# Patient Record
Sex: FEMALE | Race: WHITE | NOT HISPANIC OR LATINO | URBAN - METROPOLITAN AREA
[De-identification: names, ages, dates, MRNs, and addresses within clinical notes are randomized per-mention and may not be internally consistent; named-entity substitution may affect disease eponyms.]

---

## 2017-12-07 ENCOUNTER — INPATIENT (INPATIENT)
Facility: HOSPITAL | Age: 82
LOS: 5 days | Discharge: NURSING, REHAB OR LONG-TERM CARE | DRG: 467 | End: 2017-12-13
Attending: ORTHOPAEDIC SURGERY | Admitting: ORTHOPAEDIC SURGERY
Payer: MEDICARE

## 2017-12-07 VITALS
RESPIRATION RATE: 20 BRPM | OXYGEN SATURATION: 95 % | DIASTOLIC BLOOD PRESSURE: 77 MMHG | SYSTOLIC BLOOD PRESSURE: 168 MMHG | TEMPERATURE: 100 F | HEART RATE: 85 BPM

## 2017-12-07 LAB
ALBUMIN SERPL ELPH-MCNC: 3.4 G/DL — SIGNIFICANT CHANGE UP (ref 3.3–5)
ALP SERPL-CCNC: 104 U/L — SIGNIFICANT CHANGE UP (ref 40–120)
ALT FLD-CCNC: 14 U/L RC — SIGNIFICANT CHANGE UP (ref 10–45)
ANION GAP SERPL CALC-SCNC: 14 MMOL/L — SIGNIFICANT CHANGE UP (ref 5–17)
APPEARANCE UR: CLEAR — SIGNIFICANT CHANGE UP
APTT BLD: 37.6 SEC — HIGH (ref 27.5–37.4)
AST SERPL-CCNC: 25 U/L — SIGNIFICANT CHANGE UP (ref 10–40)
BASOPHILS # BLD AUTO: 0 K/UL — SIGNIFICANT CHANGE UP (ref 0–0.2)
BASOPHILS NFR BLD AUTO: 0.2 % — SIGNIFICANT CHANGE UP (ref 0–2)
BILIRUB SERPL-MCNC: 0.4 MG/DL — SIGNIFICANT CHANGE UP (ref 0.2–1.2)
BILIRUB UR-MCNC: NEGATIVE — SIGNIFICANT CHANGE UP
BLD GP AB SCN SERPL QL: NEGATIVE — SIGNIFICANT CHANGE UP
BUN SERPL-MCNC: 28 MG/DL — HIGH (ref 7–23)
CALCIUM SERPL-MCNC: 9 MG/DL — SIGNIFICANT CHANGE UP (ref 8.4–10.5)
CHLORIDE SERPL-SCNC: 103 MMOL/L — SIGNIFICANT CHANGE UP (ref 96–108)
CO2 SERPL-SCNC: 23 MMOL/L — SIGNIFICANT CHANGE UP (ref 22–31)
COLOR SPEC: SIGNIFICANT CHANGE UP
CREAT SERPL-MCNC: 1.1 MG/DL — SIGNIFICANT CHANGE UP (ref 0.5–1.3)
DIFF PNL FLD: NEGATIVE — SIGNIFICANT CHANGE UP
EOSINOPHIL # BLD AUTO: 0.1 K/UL — SIGNIFICANT CHANGE UP (ref 0–0.5)
EOSINOPHIL NFR BLD AUTO: 1.6 % — SIGNIFICANT CHANGE UP (ref 0–6)
GLUCOSE SERPL-MCNC: 124 MG/DL — HIGH (ref 70–99)
GLUCOSE UR QL: NEGATIVE — SIGNIFICANT CHANGE UP
HCT VFR BLD CALC: 33.8 % — LOW (ref 34.5–45)
HGB BLD-MCNC: 11.6 G/DL — SIGNIFICANT CHANGE UP (ref 11.5–15.5)
INR BLD: 2.21 RATIO — HIGH (ref 0.88–1.16)
KETONES UR-MCNC: ABNORMAL
LEUKOCYTE ESTERASE UR-ACNC: NEGATIVE — SIGNIFICANT CHANGE UP
LYMPHOCYTES # BLD AUTO: 0.9 K/UL — LOW (ref 1–3.3)
LYMPHOCYTES # BLD AUTO: 9.7 % — LOW (ref 13–44)
MCHC RBC-ENTMCNC: 31.8 PG — SIGNIFICANT CHANGE UP (ref 27–34)
MCHC RBC-ENTMCNC: 34.3 GM/DL — SIGNIFICANT CHANGE UP (ref 32–36)
MCV RBC AUTO: 92.6 FL — SIGNIFICANT CHANGE UP (ref 80–100)
MONOCYTES # BLD AUTO: 1.2 K/UL — HIGH (ref 0–0.9)
MONOCYTES NFR BLD AUTO: 13.7 % — SIGNIFICANT CHANGE UP (ref 2–14)
NEUTROPHILS # BLD AUTO: 6.8 K/UL — SIGNIFICANT CHANGE UP (ref 1.8–7.4)
NEUTROPHILS NFR BLD AUTO: 74.8 % — SIGNIFICANT CHANGE UP (ref 43–77)
NITRITE UR-MCNC: NEGATIVE — SIGNIFICANT CHANGE UP
PH UR: 6 — SIGNIFICANT CHANGE UP (ref 5–8)
PLATELET # BLD AUTO: 387 K/UL — SIGNIFICANT CHANGE UP (ref 150–400)
POTASSIUM SERPL-MCNC: 4.1 MMOL/L — SIGNIFICANT CHANGE UP (ref 3.5–5.3)
POTASSIUM SERPL-SCNC: 4.1 MMOL/L — SIGNIFICANT CHANGE UP (ref 3.5–5.3)
PROT SERPL-MCNC: 6.3 G/DL — SIGNIFICANT CHANGE UP (ref 6–8.3)
PROT UR-MCNC: SIGNIFICANT CHANGE UP
PROTHROM AB SERPL-ACNC: 24.3 SEC — HIGH (ref 9.8–12.7)
RBC # BLD: 3.65 M/UL — LOW (ref 3.8–5.2)
RBC # FLD: 13.4 % — SIGNIFICANT CHANGE UP (ref 10.3–14.5)
RH IG SCN BLD-IMP: POSITIVE — SIGNIFICANT CHANGE UP
SODIUM SERPL-SCNC: 140 MMOL/L — SIGNIFICANT CHANGE UP (ref 135–145)
SP GR SPEC: 1.02 — SIGNIFICANT CHANGE UP (ref 1.01–1.02)
UROBILINOGEN FLD QL: NEGATIVE — SIGNIFICANT CHANGE UP
WBC # BLD: 9 K/UL — SIGNIFICANT CHANGE UP (ref 3.8–10.5)
WBC # FLD AUTO: 9 K/UL — SIGNIFICANT CHANGE UP (ref 3.8–10.5)

## 2017-12-07 PROCEDURE — 71010: CPT | Mod: 26

## 2017-12-07 PROCEDURE — 99152 MOD SED SAME PHYS/QHP 5/>YRS: CPT

## 2017-12-07 PROCEDURE — 99285 EMERGENCY DEPT VISIT HI MDM: CPT | Mod: 25

## 2017-12-07 RX ORDER — FUROSEMIDE 40 MG
20 TABLET ORAL EVERY OTHER DAY
Qty: 0 | Refills: 0 | Status: DISCONTINUED | OUTPATIENT
Start: 2017-12-07 | End: 2017-12-10

## 2017-12-07 RX ORDER — LEVOTHYROXINE SODIUM 125 MCG
25 TABLET ORAL DAILY
Qty: 0 | Refills: 0 | Status: DISCONTINUED | OUTPATIENT
Start: 2017-12-07 | End: 2017-12-11

## 2017-12-07 RX ORDER — TIOTROPIUM BROMIDE 18 UG/1
1 CAPSULE ORAL; RESPIRATORY (INHALATION) DAILY
Qty: 0 | Refills: 0 | Status: DISCONTINUED | OUTPATIENT
Start: 2017-12-07 | End: 2017-12-11

## 2017-12-07 RX ORDER — AMLODIPINE BESYLATE 2.5 MG/1
5 TABLET ORAL DAILY
Qty: 0 | Refills: 0 | Status: DISCONTINUED | OUTPATIENT
Start: 2017-12-07 | End: 2017-12-11

## 2017-12-07 RX ORDER — BUDESONIDE, MICRONIZED 100 %
0.5 POWDER (GRAM) MISCELLANEOUS
Qty: 0 | Refills: 0 | Status: DISCONTINUED | OUTPATIENT
Start: 2017-12-07 | End: 2017-12-11

## 2017-12-07 RX ORDER — ALBUTEROL 90 UG/1
2 AEROSOL, METERED ORAL EVERY 6 HOURS
Qty: 0 | Refills: 0 | Status: DISCONTINUED | OUTPATIENT
Start: 2017-12-07 | End: 2017-12-11

## 2017-12-07 RX ORDER — SODIUM CHLORIDE 0.65 %
1 AEROSOL, SPRAY (ML) NASAL DAILY
Qty: 0 | Refills: 0 | Status: DISCONTINUED | OUTPATIENT
Start: 2017-12-07 | End: 2017-12-11

## 2017-12-07 RX ORDER — FAMOTIDINE 10 MG/ML
20 INJECTION INTRAVENOUS EVERY OTHER DAY
Qty: 0 | Refills: 0 | Status: DISCONTINUED | OUTPATIENT
Start: 2017-12-07 | End: 2017-12-10

## 2017-12-07 RX ORDER — DRONEDARONE 400 MG/1
400 TABLET, FILM COATED ORAL
Qty: 0 | Refills: 0 | Status: DISCONTINUED | OUTPATIENT
Start: 2017-12-07 | End: 2017-12-11

## 2017-12-07 RX ORDER — VENLAFAXINE HCL 75 MG
75 CAPSULE, EXT RELEASE 24 HR ORAL
Qty: 0 | Refills: 0 | Status: DISCONTINUED | OUTPATIENT
Start: 2017-12-07 | End: 2017-12-11

## 2017-12-07 RX ORDER — ALPRAZOLAM 0.25 MG
0.25 TABLET ORAL AT BEDTIME
Qty: 0 | Refills: 0 | Status: DISCONTINUED | OUTPATIENT
Start: 2017-12-07 | End: 2017-12-11

## 2017-12-07 RX ADMIN — PROPOFOL 40 MILLIGRAM(S): 10 INJECTION, EMULSION INTRAVENOUS at 23:52

## 2017-12-07 NOTE — H&P ADULT - NSHPLABSRESULTS_GEN_ALL_CORE
11.6   9.0   )-----------( 387      ( 07 Dec 2017 22:09 )             33.8             Vital Signs Last 24 Hrs  T(C): 37.2 (12-07-17 @ 20:45), Max: 37.6 (12-07-17 @ 20:35)  T(F): 99 (12-07-17 @ 20:45), Max: 99.6 (12-07-17 @ 20:35)  HR: 85 (12-07-17 @ 20:45) (85 - 85)  BP: 173/81 (12-07-17 @ 20:45) (168/77 - 173/81)  BP(mean): --  RR: 18 (12-07-17 @ 20:45) (18 - 20)  SpO2: 96% (12-07-17 @ 20:45) (95% - 96%)    Imaging: XR were personally reviewed and dislocated L ROB constrained liner

## 2017-12-07 NOTE — H&P ADULT - ASSESSMENT
A/P: 87y Female with L ROB dislocation  Pain control  NWB LLE, bedrest  abduction pillow  knee immobilizer   FU labs/imaging  Medical clearance/optimization for OR  revision arthroplasty 12/9

## 2017-12-07 NOTE — ED PROVIDER NOTE - PROGRESS NOTE DETAILS
s/p procedural sedation by myself for hip reduction by ortho; hip now back in place.  Patient awake alert and well appearing; to be admitted to be ortho for further evaluation/management. Abi

## 2017-12-07 NOTE — ED ADULT NURSE NOTE - OBJECTIVE STATEMENT
86 y/o F, PMH A.fib on Coumadin, COPD, PSH L hip replacement, L hip revision x2, BIBA transfer from [facility] for L hip dislocation. Pt states that she coughed yesterday night (Wednesday), "it's a smokers cough," and felt her hip pop out, pt presented to [facility] this morning. EMS reports [facility] was unable to reduce hip so pt was accepted and transferred to [facility]. Pt denies L hip pain at rest and reports 5/10 with movement. LLE is shortened and internally rotated. Pt has pos and equal sensation to extremities bilat, strong peripheral pulses x 4, no numbness/tingling, pt able to wiggle L toes. 16F Faith catheter inserted using sterile technique. Second RN present to confirm sterility. Bedside drainage to gravity. Stat lock in place, secured to upper thigh. Initial output approx 100 cc yellow urine, UA/culture drawn and sent as ordered by Amber CEJA. Pt arrived with LAC 20G IV from prior facility, 2nd IV placed 20G to RAC. Skin hot/dry, rectal temp 100F, MD aware. Family at bedside, safety maintained. 88 y/o F, PMH A.fib on Coumadin, COPD, PSH L hip replacement, L hip revision x2, BIBA transfer from [facility] for L hip dislocation. Pt states that she coughed yesterday night (Wednesday), "it's a smokers cough," and felt her hip pop out, pt presented to [facility] this morning. EMS reports [facility] was unable to reduce hip so pt was accepted and transferred to [facility]. EMS reports pt was given PO Amlodipine for her BP at [facility]. Pt denies L hip pain at rest and reports 5/10 with movement. LLE is shortened and internally rotated. Pt has pos and equal sensation to extremities bilat, strong peripheral pulses x 4, no numbness/tingling, pt able to wiggle L toes. 16F Faith catheter inserted using sterile technique. Second RN present to confirm sterility. Bedside drainage to gravity. Stat lock in place, secured to upper thigh. Initial output approx 100 cc yellow urine, UA/culture drawn and sent as ordered by Amber CEJA. Pt arrived with LAC 20G IV from prior facility, 2nd IV placed 20G to RAC. Skin hot/dry, rectal temp 100F, MD aware. Family at bedside, safety maintained.

## 2017-12-07 NOTE — ED PROVIDER NOTE - PMH
A-fib    Anxiety    COPD (chronic obstructive pulmonary disease)    H/O: Osteoarthritis    High Cholesterol    History of Hypothyroidism    HTN (Hypertension)

## 2017-12-07 NOTE — CONSULT NOTE ADULT - SUBJECTIVE AND OBJECTIVE BOX
87y Female pmh copd, afib (coumadin), lymphoma (sp radiation), community ambulator with assistance with walker presenting with L total hip arthroplasty dislocation, denies trauma. L ROB in 2015 by Patrick Muir sp polyexchange and I+D, c/b 2 dislocations and revision L ROB. The patient has had a R ROB and revision done by Dr. Baer about 15 and 10 years ago respectively. Denies HS/LOC. Denies numbness/tingling. Denies fever/chills. Denies pain/injury elsewhere. Patient seen now resting comfortably. scheduled for revision of LTHA        PAST MEDICAL & SURGICAL HISTORY:  H/O: Osteoarthritis  Anxiety  History of Hypothyroidism  High Cholesterol  HTN (Hypertension)  S/P Carpal Tunnel Release  Hip Replacement  Knee Replacement        MEDICATIONS  (STANDING):  amLODIPine   Tablet 5 milliGRAM(s) Oral daily  artificial  tears Solution 1 Drop(s) Both EYES daily  buDESOnide   0.5 milliGRAM(s) Respule 0.5 milliGRAM(s) Inhalation two times a day  dronedarone 400 milliGRAM(s) Oral two times a day  famotidine    Tablet 20 milliGRAM(s) Oral every other day  furosemide    Tablet 20 milliGRAM(s) Oral every other day  levothyroxine 25 MICROGram(s) Oral daily  tiotropium 18 MICROgram(s) Capsule 1 Capsule(s) Inhalation daily  venlafaxine 75 milliGRAM(s) Oral two times a day with meals    MEDICATIONS  (PRN):  ALBUTerol    90 MICROgram(s) HFA Inhaler 2 Puff(s) Inhalation every 6 hours PRN Bronchospasm  ALPRAZolam 0.25 milliGRAM(s) Oral at bedtime PRN sleep/anxiety  sodium chloride 0.65% Nasal 1 Spray(s) Both Nostrils daily PRN Nasal Congestion    SOC HX:  Tobacco: quit 20 years ago. as per daughter Patient was a heavy smoker  ETOH: Rarely  Drugs: Neg    Family Hx   Non contributory      ROS  CONSTITUTIONAL: No weakness, fevers or chills  EYES/ENT: No visual changes;  No vertigo or throat pain   NECK: No pain or stiffness  RESPIRATORY: + cough, no wheezing, hemoptysis;+ shortness of breath  CARDIOVASCULAR: No chest pain or palpitations  GASTROINTESTINAL: No abdominal or epigastric pain. No nausea, vomiting, or hematemesis; No diarrhea or constipation. No melena or hematochezia.  GENITOURINARY: No dysuria, frequency or hematuria  NEUROLOGICAL: No numbness or weakness  SKIN: No itching, burning, rashes, or lesions   MUSCULOSKELETAL: left leg pain.    INTERVAL HPI/OVERNIGHT EVENTS:  T(C): 37.2 (17 @ 20:45), Max: 37.6 (17 @ 20:35)  HR: 85 (17 @ 20:45) (85 - 85)  BP: 173/81 (17 @ 20:45) (168/77 - 173/81)  RR: 18 (17 @ 20:45) (18 - 20)  SpO2: 96% (17 @ 20:45) (95% - 96%)  Wt(kg): --  I&O's Summary      PHYSICAL EXAM:  GENERAL: NAD, well-groomed, well-developed  HEAD:  Atraumatic, Normocephalic  EYES: EOMI, PERRLA, conjunctiva and sclera clear  ENMT: No tonsillar erythema, exudates, or enlargement; Moist mucous membranes, Good dentition, No lesions  NECK: Supple, No JVD, Normal thyroid  NERVOUS SYSTEM:  Alert & Oriented X3, Good concentration; Motor Strength 5/5 B/L upper and lower extremities; DTRs 2+ intact and symmetric  CHEST/LUNG: Clear to percussion bilaterally decreased breath sounds B/l   HEART: Regular rate and rhythm; No murmurs, rubs, or gallops  ABDOMEN: Soft, Nontender, Nondistended; Bowel sounds present  EXTREMITIES:  internal rotation of left leg  LYMPH: No lymphadenopathy noted  SKIN: No rashes or lesions        LABS:                        11.6   9.0   )-----------( 387      ( 07 Dec 2017 22:09 )             33.8         140  |  103  |  28<H>  ----------------------------<  124<H>  4.1   |  23  |  1.10    Ca    9.0      07 Dec 2017 22:09    TPro  6.3  /  Alb  3.4  /  TBili  0.4  /  DBili  x   /  AST  25  /  ALT  14  /  AlkPhos  104  -    PT/INR - ( 07 Dec 2017 22:09 )   PT: 24.3 sec;   INR: 2.21 ratio         PTT - ( 07 Dec 2017 22:09 )  PTT:37.6 sec  Urinalysis Basic - ( 07 Dec 2017 22:45 )    Color: PL Yellow / Appearance: Clear / S.018 / pH: x  Gluc: x / Ketone: Trace  / Bili: Negative / Urobili: Negative   Blood: x / Protein: Trace / Nitrite: Negative   Leuk Esterase: Negative / RBC: x / WBC x   Sq Epi: x / Non Sq Epi: x / Bacteria: x      EKG  sinus rhythm at 86 with PACs            Urinalysis Basic - ( 07 Dec 2017 22:45 )    Color: PL Yellow / Appearance: Clear / S.018 / pH: x  Gluc: x / Ketone: Trace  / Bili: Negative / Urobili: Negative   Blood: x / Protein: Trace / Nitrite: Negative   Leuk Esterase: Negative / RBC: x / WBC x   Sq Epi: x / Non Sq Epi: x / Bacteria: x        Radiology reports:

## 2017-12-07 NOTE — ED PROVIDER NOTE - NS_ ATTENDINGSCRIBEDETAILS _ED_A_ED_FT
The scribe's documentation has been prepared under my direction and personally reviewed by me in its entirety. I confirm that the note above accurately reflects all work, treatment, procedures, and medical decision making performed by me.  ETELVINA Talbert MD

## 2017-12-07 NOTE — ED PROVIDER NOTE - OBJECTIVE STATEMENT
86 y/o F pt with PMHx of a-fib, COPD, PSHx of left hip replacement, left hip revision x2 transferred from [facility] for left hip dislocation. Pt states that she coughed yesterday and felt her hip pop out. States that [facility] didn't try to reduce the hip and contacted orthopedics at [facility]. Pt had her procedures done in [place]. Current medications: coumadin NKDA.

## 2017-12-07 NOTE — ED PROVIDER NOTE - MEDICAL DECISION MAKING DETAILS
88 y/o F pt presents to the ED for left hip prosthetic dislocation for greater than 24 hours and complicated surgical hx including prosthetic replacement and revisions. Will consult orthopedics for assistance for relocation likely require admission for this.

## 2017-12-07 NOTE — H&P ADULT - HISTORY OF PRESENT ILLNESS
87y Female pmh copd, afib (coumadin), lymphoma (sp radiation), community ambulator with assistance with walker presenting with L total hip arthroplasty dislocation, denies trauma. L ROB in 2015 by Patrick Muir sp polyexchange and I+D, c/b 2 dislocations and revision L ROB. The patient has had a R ROB and revision done by Dr. Baer about 15 and 10 years ago respectively. Denies HS/LOC. Denies numbness/tingling. Denies fever/chills. Denies pain/injury elsewhere.     PAST MEDICAL & SURGICAL HISTORY:  H/O: Osteoarthritis  Anxiety  History of Hypothyroidism  High Cholesterol  HTN (Hypertension)  S/P Carpal Tunnel Release  Hip Replacement  Knee Replacement    MEDICATIONS  (STANDING):    Allergies    No Known Drug Allergies  shellfish (Diarrhea)    Intolerances

## 2017-12-07 NOTE — ED ADULT NURSE REASSESSMENT NOTE - NS ED NURSE REASSESS COMMENT FT1
2345: Preparing for Conscious Sedation at bedside. Crash cart, ambu bag at bedside, pt on CCM with capnography, pt on 4L nasal cannula with SpO2 100%.  2350: Time-out. Ortho MD Gold, Ortho Resident, MD Talbert, 2 ED RNs, EDT at bedside. Pt resting comfortably in bed, AAOx3, NAD, resp nonlabored. Procedure explained to pt. Pt verbalizes understanding of procedure.   2352: Pt medicated by MD Talbert as ordered for sedation.   2359: Procedure complete. X-ray at bedside. Abduction pillow in place by MD Wallace. Conscious Sedation paperwork in chart.  0032: Pt resting comfortably in bed, AAOx3, NAD, resp nonlabored, pt denies any pain at this time. Pt awaiting bed.

## 2017-12-07 NOTE — H&P ADULT - NSHPPHYSICALEXAM_GEN_ALL_CORE
Physical Exam  Gen: NAD  L LE: skin intact, short/IR leg, unable to SLR, + log roll, +ttp hip/groin, no ttp elsewhere, +ehl/fhl/ta/gs function, no calf ttp, dp/pt pulse intact, compartments soft  Secondary survey: benign, nv intact, able to SLR contralateral leg, negative log roll contralateral leg, no bony ttp elsewhere, bilateral upper extremity skin intact with no gross deformity, non tender to palpation over bony prominences, neurovascularly intact

## 2017-12-08 DIAGNOSIS — S73.005A UNSPECIFIED DISLOCATION OF LEFT HIP, INITIAL ENCOUNTER: ICD-10-CM

## 2017-12-08 DIAGNOSIS — I48.0 PAROXYSMAL ATRIAL FIBRILLATION: ICD-10-CM

## 2017-12-08 DIAGNOSIS — I10 ESSENTIAL (PRIMARY) HYPERTENSION: ICD-10-CM

## 2017-12-08 DIAGNOSIS — J44.9 CHRONIC OBSTRUCTIVE PULMONARY DISEASE, UNSPECIFIED: ICD-10-CM

## 2017-12-08 LAB
ANION GAP SERPL CALC-SCNC: 16 MMOL/L — SIGNIFICANT CHANGE UP (ref 5–17)
APTT BLD: 40.6 SEC — HIGH (ref 27.5–37.4)
B PERT IGG+IGM PNL SER: ABNORMAL
BUN SERPL-MCNC: 29 MG/DL — HIGH (ref 7–23)
CALCIUM SERPL-MCNC: 9.2 MG/DL — SIGNIFICANT CHANGE UP (ref 8.4–10.5)
CHLORIDE SERPL-SCNC: 101 MMOL/L — SIGNIFICANT CHANGE UP (ref 96–108)
CO2 SERPL-SCNC: 25 MMOL/L — SIGNIFICANT CHANGE UP (ref 22–31)
COLOR FLD: SIGNIFICANT CHANGE UP
CREAT SERPL-MCNC: 1.06 MG/DL — SIGNIFICANT CHANGE UP (ref 0.5–1.3)
CRP SERPL-MCNC: 12.2 MG/DL — HIGH (ref 0–0.4)
CRYSTALS SNV QL MICRO: SIGNIFICANT CHANGE UP
CULTURE RESULTS: NO GROWTH — SIGNIFICANT CHANGE UP
FLUID INTAKE SUBSTANCE CLASS: SIGNIFICANT CHANGE UP
FLUID SEGMENTED GRANULOCYTES: 20 % — SIGNIFICANT CHANGE UP
GLUCOSE SERPL-MCNC: 104 MG/DL — HIGH (ref 70–99)
GRAM STN FLD: SIGNIFICANT CHANGE UP
HCT VFR BLD CALC: 36.5 % — SIGNIFICANT CHANGE UP (ref 34.5–45)
HGB BLD-MCNC: 12.1 G/DL — SIGNIFICANT CHANGE UP (ref 11.5–15.5)
INR BLD: 2.09 RATIO — HIGH (ref 0.88–1.16)
LYMPHOCYTES # FLD: 5 % — SIGNIFICANT CHANGE UP
MCHC RBC-ENTMCNC: 30.5 PG — SIGNIFICANT CHANGE UP (ref 27–34)
MCHC RBC-ENTMCNC: 33 GM/DL — SIGNIFICANT CHANGE UP (ref 32–36)
MCV RBC AUTO: 92.4 FL — SIGNIFICANT CHANGE UP (ref 80–100)
MONOS+MACROS # FLD: 75 % — SIGNIFICANT CHANGE UP
PLATELET # BLD AUTO: 414 K/UL — HIGH (ref 150–400)
POTASSIUM SERPL-MCNC: 4 MMOL/L — SIGNIFICANT CHANGE UP (ref 3.5–5.3)
POTASSIUM SERPL-SCNC: 4 MMOL/L — SIGNIFICANT CHANGE UP (ref 3.5–5.3)
PROTHROM AB SERPL-ACNC: 23.1 SEC — HIGH (ref 9.8–12.7)
RBC # BLD: 3.95 M/UL — SIGNIFICANT CHANGE UP (ref 3.8–5.2)
RBC # FLD: 13.4 % — SIGNIFICANT CHANGE UP (ref 10.3–14.5)
RCV VOL RI: HIGH /UL (ref 0–5)
SODIUM SERPL-SCNC: 142 MMOL/L — SIGNIFICANT CHANGE UP (ref 135–145)
SPECIMEN SOURCE: SIGNIFICANT CHANGE UP
SPECIMEN SOURCE: SIGNIFICANT CHANGE UP
TOTAL NUCLEATED CELL COUNT, BODY FLUID: 64 /UL — HIGH (ref 0–5)
TUBE TYPE: SIGNIFICANT CHANGE UP
WBC # BLD: 10.3 K/UL — SIGNIFICANT CHANGE UP (ref 3.8–10.5)
WBC # FLD AUTO: 10.3 K/UL — SIGNIFICANT CHANGE UP (ref 3.8–10.5)

## 2017-12-08 PROCEDURE — 72194 CT PELVIS W/O & W/DYE: CPT | Mod: 26

## 2017-12-08 PROCEDURE — 20611 DRAIN/INJ JOINT/BURSA W/US: CPT | Mod: LT

## 2017-12-08 PROCEDURE — 73502 X-RAY EXAM HIP UNI 2-3 VIEWS: CPT | Mod: 26,LT

## 2017-12-08 PROCEDURE — 99223 1ST HOSP IP/OBS HIGH 75: CPT | Mod: 57,AI

## 2017-12-08 PROCEDURE — 27265 TREAT HIP DISLOCATION: CPT | Mod: LT

## 2017-12-08 RX ORDER — OXYCODONE HYDROCHLORIDE 5 MG/1
5 TABLET ORAL EVERY 4 HOURS
Qty: 0 | Refills: 0 | Status: DISCONTINUED | OUTPATIENT
Start: 2017-12-08 | End: 2017-12-11

## 2017-12-08 RX ORDER — SODIUM CHLORIDE 9 MG/ML
1000 INJECTION, SOLUTION INTRAVENOUS
Qty: 0 | Refills: 0 | Status: DISCONTINUED | OUTPATIENT
Start: 2017-12-08 | End: 2017-12-11

## 2017-12-08 RX ORDER — ACETAMINOPHEN 500 MG
650 TABLET ORAL EVERY 6 HOURS
Qty: 0 | Refills: 0 | Status: DISCONTINUED | OUTPATIENT
Start: 2017-12-08 | End: 2017-12-11

## 2017-12-08 RX ORDER — DRONEDARONE 400 MG/1
1 TABLET, FILM COATED ORAL
Qty: 0 | Refills: 0 | COMMUNITY

## 2017-12-08 RX ORDER — LEVOTHYROXINE SODIUM 125 MCG
1 TABLET ORAL
Qty: 0 | Refills: 0 | COMMUNITY

## 2017-12-08 RX ORDER — VENLAFAXINE HCL 75 MG
1 CAPSULE, EXT RELEASE 24 HR ORAL
Qty: 0 | Refills: 0 | COMMUNITY

## 2017-12-08 RX ORDER — ACETAMINOPHEN 500 MG
325 TABLET ORAL EVERY 4 HOURS
Qty: 0 | Refills: 0 | Status: DISCONTINUED | OUTPATIENT
Start: 2017-12-08 | End: 2017-12-11

## 2017-12-08 RX ORDER — HYDROMORPHONE HYDROCHLORIDE 2 MG/ML
1 INJECTION INTRAMUSCULAR; INTRAVENOUS; SUBCUTANEOUS EVERY 4 HOURS
Qty: 0 | Refills: 0 | Status: DISCONTINUED | OUTPATIENT
Start: 2017-12-08 | End: 2017-12-11

## 2017-12-08 RX ORDER — FUROSEMIDE 40 MG
1 TABLET ORAL
Qty: 0 | Refills: 0 | COMMUNITY

## 2017-12-08 RX ORDER — HEPARIN SODIUM 5000 [USP'U]/ML
5000 INJECTION INTRAVENOUS; SUBCUTANEOUS EVERY 8 HOURS
Qty: 0 | Refills: 0 | Status: DISCONTINUED | OUTPATIENT
Start: 2017-12-08 | End: 2017-12-08

## 2017-12-08 RX ORDER — RANITIDINE HYDROCHLORIDE 150 MG/1
1 TABLET, FILM COATED ORAL
Qty: 0 | Refills: 0 | COMMUNITY

## 2017-12-08 RX ORDER — AMLODIPINE BESYLATE 2.5 MG/1
1 TABLET ORAL
Qty: 0 | Refills: 0 | COMMUNITY

## 2017-12-08 RX ORDER — OXYCODONE HYDROCHLORIDE 5 MG/1
10 TABLET ORAL EVERY 4 HOURS
Qty: 0 | Refills: 0 | Status: DISCONTINUED | OUTPATIENT
Start: 2017-12-08 | End: 2017-12-11

## 2017-12-08 RX ORDER — TIOTROPIUM BROMIDE 18 UG/1
1 CAPSULE ORAL; RESPIRATORY (INHALATION)
Qty: 0 | Refills: 0 | COMMUNITY

## 2017-12-08 RX ORDER — BUDESONIDE, MICRONIZED 100 %
2 POWDER (GRAM) MISCELLANEOUS
Qty: 0 | Refills: 0 | COMMUNITY

## 2017-12-08 RX ORDER — PROPOFOL 10 MG/ML
40 INJECTION, EMULSION INTRAVENOUS ONCE
Qty: 0 | Refills: 0 | Status: COMPLETED | OUTPATIENT
Start: 2017-12-07 | End: 2017-12-07

## 2017-12-08 RX ORDER — ALBUTEROL 90 UG/1
3 AEROSOL, METERED ORAL
Qty: 0 | Refills: 0 | COMMUNITY

## 2017-12-08 RX ORDER — ALPRAZOLAM 0.25 MG
1 TABLET ORAL
Qty: 0 | Refills: 0 | COMMUNITY

## 2017-12-08 RX ADMIN — Medication 0.5 MILLIGRAM(S): at 05:45

## 2017-12-08 RX ADMIN — FAMOTIDINE 20 MILLIGRAM(S): 10 INJECTION INTRAVENOUS at 13:32

## 2017-12-08 RX ADMIN — TIOTROPIUM BROMIDE 1 CAPSULE(S): 18 CAPSULE ORAL; RESPIRATORY (INHALATION) at 13:32

## 2017-12-08 RX ADMIN — Medication 75 MILLIGRAM(S): at 17:22

## 2017-12-08 RX ADMIN — Medication 1 DROP(S): at 13:32

## 2017-12-08 RX ADMIN — Medication 20 MILLIGRAM(S): at 01:30

## 2017-12-08 RX ADMIN — DRONEDARONE 400 MILLIGRAM(S): 400 TABLET, FILM COATED ORAL at 17:21

## 2017-12-08 RX ADMIN — Medication 0.5 MILLIGRAM(S): at 17:22

## 2017-12-08 RX ADMIN — AMLODIPINE BESYLATE 5 MILLIGRAM(S): 2.5 TABLET ORAL at 01:22

## 2017-12-08 RX ADMIN — Medication 75 MILLIGRAM(S): at 08:39

## 2017-12-08 RX ADMIN — DRONEDARONE 400 MILLIGRAM(S): 400 TABLET, FILM COATED ORAL at 05:46

## 2017-12-08 RX ADMIN — HEPARIN SODIUM 5000 UNIT(S): 5000 INJECTION INTRAVENOUS; SUBCUTANEOUS at 05:45

## 2017-12-08 NOTE — ED PROCEDURE NOTE - NS_POSTPROCCAREGUIDE_ED_ALL_ED
Patient is now fully awake, with vital signs and temperature stable, hydration is adequate, patients Darrel’s  score is at baseline (or greater than 8), patient and escort has received  discharge education.

## 2017-12-08 NOTE — PROGRESS NOTE ADULT - SUBJECTIVE AND OBJECTIVE BOX
No acute events overnight. Pain controlled.     PE:  Vital Signs Last 24 Hrs  T(C): 36.7 (08 Dec 2017 05:36), Max: 37.8 (07 Dec 2017 22:00)  T(F): 98.1 (08 Dec 2017 05:36), Max: 100 (07 Dec 2017 22:00)  HR: 89 (08 Dec 2017 05:36) (85 - 91)  BP: 162/82 (08 Dec 2017 05:36) (162/82 - 209/86)  RR: 16 (08 Dec 2017 05:36) (16 - 20)  SpO2: 93% (08 Dec 2017 05:36) (93% - 100%)    Gen: No acute distress  LLE: skin intact  Motor intact TA/GCS/EHL/FHL   SILT DP/SP/Tib  cap refill <2 sec, wwp     Labs:                        11.6   9.0   )-----------( 387      ( 07 Dec 2017 22:09 )             33.8   12-07    140  |  103  |  28<H>  ----------------------------<  124<H>  4.1   |  23  |  1.10    Ca    9.0      07 Dec 2017 22:09    TPro  6.3  /  Alb  3.4  /  TBili  0.4  /  DBili  x   /  AST  25  /  ALT  14  /  AlkPhos  104  12-07    Assessment/Plan:  87yFemale w/ L ROB dislocation s/p ED reduction  -Preop for tomorrow for revision ROB  -please document med clearance   -pain control  -PT  -bed rest, NWB for now  -post hip precatuions  -DVT ppx  -dispo plan

## 2017-12-08 NOTE — PROGRESS NOTE ADULT - SUBJECTIVE AND OBJECTIVE BOX
Patient is a 87y old  Female who presents with a chief complaint of left hip dislocation (07 Dec 2017 22:35)      HPI: Patient; is w/o sob chest pain     MEDICATIONS  (STANDING):  amLODIPine   Tablet 5 milliGRAM(s) Oral daily  artificial  tears Solution 1 Drop(s) Both EYES daily  buDESOnide   0.5 milliGRAM(s) Respule 0.5 milliGRAM(s) Inhalation two times a day  dronedarone 400 milliGRAM(s) Oral two times a day  famotidine    Tablet 20 milliGRAM(s) Oral every other day  furosemide    Tablet 20 milliGRAM(s) Oral every other day  lactated ringers. 1000 milliLiter(s) (75 mL/Hr) IV Continuous <Continuous>  levothyroxine 25 MICROGram(s) Oral daily  tiotropium 18 MICROgram(s) Capsule 1 Capsule(s) Inhalation daily  venlafaxine 75 milliGRAM(s) Oral two times a day with meals    MEDICATIONS  (PRN):  acetaminophen   Tablet 650 milliGRAM(s) Oral every 6 hours PRN For Temp greater than 38 C (100.4 F)  acetaminophen   Tablet. 325 milliGRAM(s) Oral every 4 hours PRN Mild Pain (1 - 3)  ALBUTerol    90 MICROgram(s) HFA Inhaler 2 Puff(s) Inhalation every 6 hours PRN Bronchospasm  ALPRAZolam 0.25 milliGRAM(s) Oral at bedtime PRN sleep/anxiety  HYDROmorphone  Injectable 1 milliGRAM(s) IV Push every 4 hours PRN breakthrough pain  oxyCODONE    IR 10 milliGRAM(s) Oral every 4 hours PRN Severe Pain (7 - 10)  oxyCODONE    IR 5 milliGRAM(s) Oral every 4 hours PRN Moderate Pain (4 - 6)  sodium chloride 0.65% Nasal 1 Spray(s) Both Nostrils daily PRN Nasal Congestion      Allergies    No Known Drug Allergies  shellfish (Diarrhea)    Intolerances        REVIEW OF SYSTEM:  CONSTITUTIONAL: No fever, No change in weight, No fatigue  HEAD: No headache, No dizziness, No recent trauma  EYES: No eye pain, No visual disturbances, No discharge  ENT:  No difficulty hearing, No tinnitus, No vertigo, No sinus pain, No throat pain  NECK: No pain, No stiffness  BREASTS: No pain, No masses, No nipple discharge  RESPIRATORY: No cough, No wheezing, No chills, No hemoptysis, No shortness of breath at rest or exertional shortness of breath  CARDIOVASCULAR: No chest pain, No palpitations, No dizziness, No CHF, No arrhythmia, No cardiomegaly, No leg swelling  GASTROINTESTINAL: No abdominal, No epigastric pain. No nausea, No vomiting, No hematemesis, No diarrhea, No constipation. No melena, No hematochezia. No GERD  GENITOURINARY: No dysuria, No frequency, No hematuria, No incontinence, No nocturia, No hesitancy,  SKIN: No itching, No burning, No rashes, No lesions   LYMPH NODES: No history of enlarged glands  ENDOCRINE: No heat or cold intolerance, No hair loss. No osteoporosis, No thyroid disease  MUSCULOSKELETAL: No joint pain or swelling, No muscle, back, or extremity pain  PSYCHIATRIC: No depression, No anxiety, No mood swings, No difficulty sleeping  HEME/LYMPH: No easy bruising, No anticoagulants, No bleeding disorder, No bleeding gums  ALLERGY AND IMMUNOLOGIC: No hives, No eczema  NEUROLOGICAL: No memory loss, No loss of strength, No numbness, No tremors        VITALS:   T(C): 36.7 (12-08-17 @ 21:24), Max: 37.6 (12-08-17 @ 02:10)  HR: 83 (12-08-17 @ 21:24) (83 - 91)  BP: 153/72 (12-08-17 @ 21:24) (130/73 - 209/86)  RR: 18 (12-08-17 @ 21:24) (16 - 18)  SpO2: 94% (12-08-17 @ 21:24) (93% - 100%)  Wt(kg): --    12-07 @ 07:01  -  12-08 @ 07:00  --------------------------------------------------------  IN: 0 mL / OUT: 750 mL / NET: -750 mL    12-08 @ 07:01  -  12-08 @ 23:08  --------------------------------------------------------  IN: 620 mL / OUT: 600 mL / NET: 20 mL        PHYSICAL EXAM:  GENERAL: NAD, well nourished and conversant  HEAD:  Atraumatic  EYES: EOM, PERRLA, conjunctiva pink and sclera white  ENT: No tonsillar erythema, exudates, or enlargement, moist mucous membranes, good dentition, no lesions  NECK: Supple, No JVD, normal thyroid, carotids with normal upstrokes and no bruits  CHEST/LUNG: Clear to auscultation bilaterally, No rales, rhonchi, wheezing, or rubs  HEART: Regular rate and rhythm, No murmurs, rubs, or gallops  ABDOMEN: Soft, nondistended, no masses, guarding, tenderness or rebound, bowel sounds present  EXTREMITIES:  2+ Peripheral Pulses, No clubbing, cyanosis, or edema.     LYMPH: No lymphadenopathy noted  SKIN: No rashes or lesions  NERVOUS SYSTEM:  Alert & Oriented X3, normal cognitive function. Motor Strength 5/5 right upper and right lower.  5/5 left upper and left lower extremities, DTRs 2+ intact and symmetric    LABS:                          12.1   10.3  )-----------( 414      ( 08 Dec 2017 08:33 )             36.5     12-08    142  |  101  |  29<H>  ----------------------------<  104<H>  4.0   |  25  |  1.06  12-07    140  |  103  |  28<H>  ----------------------------<  124<H>  4.1   |  23  |  1.10    Ca    9.2      08 Dec 2017 08:34  Ca    9.0      07 Dec 2017 22:09    TPro  6.3  /  Alb  3.4  /  TBili  0.4  /  DBili  x   /  AST  25  /  ALT  14  /  AlkPhos  104  12-07    CAPILLARY BLOOD GLUCOSE          RADIOLOGY & ADDITIONAL TESTS:      Consultant(s):    Care Discussed with Consultants/Other Providers [ ] YES  [ ] NO

## 2017-12-09 LAB
ANION GAP SERPL CALC-SCNC: 13 MMOL/L — SIGNIFICANT CHANGE UP (ref 5–17)
BUN SERPL-MCNC: 39 MG/DL — HIGH (ref 7–23)
CALCIUM SERPL-MCNC: 9.1 MG/DL — SIGNIFICANT CHANGE UP (ref 8.4–10.5)
CHLORIDE SERPL-SCNC: 99 MMOL/L — SIGNIFICANT CHANGE UP (ref 96–108)
CO2 SERPL-SCNC: 26 MMOL/L — SIGNIFICANT CHANGE UP (ref 22–31)
CREAT SERPL-MCNC: 1.4 MG/DL — HIGH (ref 0.5–1.3)
GLUCOSE SERPL-MCNC: 106 MG/DL — HIGH (ref 70–99)
HCT VFR BLD CALC: 31.1 % — LOW (ref 34.5–45)
HGB BLD-MCNC: 10.4 G/DL — LOW (ref 11.5–15.5)
INR BLD: 2.12 RATIO — HIGH (ref 0.88–1.16)
MCHC RBC-ENTMCNC: 30.8 PG — SIGNIFICANT CHANGE UP (ref 27–34)
MCHC RBC-ENTMCNC: 33.5 GM/DL — SIGNIFICANT CHANGE UP (ref 32–36)
MCV RBC AUTO: 91.8 FL — SIGNIFICANT CHANGE UP (ref 80–100)
PLATELET # BLD AUTO: 385 K/UL — SIGNIFICANT CHANGE UP (ref 150–400)
POTASSIUM SERPL-MCNC: 4.2 MMOL/L — SIGNIFICANT CHANGE UP (ref 3.5–5.3)
POTASSIUM SERPL-SCNC: 4.2 MMOL/L — SIGNIFICANT CHANGE UP (ref 3.5–5.3)
PROTHROM AB SERPL-ACNC: 23.5 SEC — HIGH (ref 9.8–12.7)
RBC # BLD: 3.39 M/UL — LOW (ref 3.8–5.2)
RBC # FLD: 13.3 % — SIGNIFICANT CHANGE UP (ref 10.3–14.5)
SODIUM SERPL-SCNC: 138 MMOL/L — SIGNIFICANT CHANGE UP (ref 135–145)
WBC # BLD: 9.4 K/UL — SIGNIFICANT CHANGE UP (ref 3.8–10.5)
WBC # FLD AUTO: 9.4 K/UL — SIGNIFICANT CHANGE UP (ref 3.8–10.5)

## 2017-12-09 RX ADMIN — Medication 325 MILLIGRAM(S): at 01:30

## 2017-12-09 RX ADMIN — TIOTROPIUM BROMIDE 1 CAPSULE(S): 18 CAPSULE ORAL; RESPIRATORY (INHALATION) at 14:35

## 2017-12-09 RX ADMIN — Medication 25 MICROGRAM(S): at 05:40

## 2017-12-09 RX ADMIN — Medication 0.5 MILLIGRAM(S): at 07:52

## 2017-12-09 RX ADMIN — Medication 1 SPRAY(S): at 10:16

## 2017-12-09 RX ADMIN — Medication 1 DROP(S): at 11:53

## 2017-12-09 RX ADMIN — AMLODIPINE BESYLATE 5 MILLIGRAM(S): 2.5 TABLET ORAL at 05:40

## 2017-12-09 RX ADMIN — Medication 75 MILLIGRAM(S): at 10:18

## 2017-12-09 RX ADMIN — DRONEDARONE 400 MILLIGRAM(S): 400 TABLET, FILM COATED ORAL at 17:49

## 2017-12-09 RX ADMIN — DRONEDARONE 400 MILLIGRAM(S): 400 TABLET, FILM COATED ORAL at 05:40

## 2017-12-09 RX ADMIN — Medication 325 MILLIGRAM(S): at 01:00

## 2017-12-09 RX ADMIN — Medication 0.5 MILLIGRAM(S): at 17:48

## 2017-12-09 RX ADMIN — Medication 75 MILLIGRAM(S): at 17:50

## 2017-12-09 NOTE — PROGRESS NOTE ADULT - SUBJECTIVE AND OBJECTIVE BOX
Patient is a 87y old  Female who presents with a chief complaint of left hip dislocation (07 Dec 2017 22:35)  Patient resting without complaints.  No chest pain, SOB, N/V.    T(C): 36.9 (12-09-17 @ 05:07), Max: 37.3 (12-08-17 @ 09:17)  HR: 83 (12-09-17 @ 05:07) (80 - 90)  BP: 138/72 (12-09-17 @ 05:07) (130/73 - 165/84)  RR: 18 (12-09-17 @ 05:07) (18 - 18)  SpO2: 94% (12-09-17 @ 05:07) (93% - 96%)    Exam:  Alert and Oriented, No Acute Distress  Cards: +S1/S2, RRR  Pulm: CTAB  Lower Extremities:  Calves Soft, Non-tender bilaterally  +PF/DF/EHL/FHL  SILT  +DP Pulse                        10.4   9.4   )-----------( 385      ( 09 Dec 2017 04:33 )             31.1    12-09  138  |  99  |  39<H>  ----------------------------<  106<H>  4.2   |  26  |  1.40<H>  Ca    9.1      09 Dec 2017 04:33  TPro  6.3  /  Alb  3.4  /  TBili  0.4  /  DBili  x   /  AST  25  /  ALT  14  /  AlkPhos  104  12-07

## 2017-12-09 NOTE — PROGRESS NOTE ADULT - SUBJECTIVE AND OBJECTIVE BOX
Patient is a 87y old  Female who presents with a chief complaint of left hip dislocation (07 Dec 2017 22:35)      HPI:    MEDICATIONS  (STANDING):  amLODIPine   Tablet 5 milliGRAM(s) Oral daily  artificial  tears Solution 1 Drop(s) Both EYES daily  buDESOnide   0.5 milliGRAM(s) Respule 0.5 milliGRAM(s) Inhalation two times a day  dronedarone 400 milliGRAM(s) Oral two times a day  famotidine    Tablet 20 milliGRAM(s) Oral every other day  furosemide    Tablet 20 milliGRAM(s) Oral every other day  lactated ringers. 1000 milliLiter(s) (75 mL/Hr) IV Continuous <Continuous>  levothyroxine 25 MICROGram(s) Oral daily  tiotropium 18 MICROgram(s) Capsule 1 Capsule(s) Inhalation daily  venlafaxine 75 milliGRAM(s) Oral two times a day with meals    MEDICATIONS  (PRN):  acetaminophen   Tablet 650 milliGRAM(s) Oral every 6 hours PRN For Temp greater than 38 C (100.4 F)  acetaminophen   Tablet. 325 milliGRAM(s) Oral every 4 hours PRN Mild Pain (1 - 3)  ALBUTerol    90 MICROgram(s) HFA Inhaler 2 Puff(s) Inhalation every 6 hours PRN Bronchospasm  ALPRAZolam 0.25 milliGRAM(s) Oral at bedtime PRN sleep/anxiety  HYDROmorphone  Injectable 1 milliGRAM(s) IV Push every 4 hours PRN breakthrough pain  oxyCODONE    IR 10 milliGRAM(s) Oral every 4 hours PRN Severe Pain (7 - 10)  oxyCODONE    IR 5 milliGRAM(s) Oral every 4 hours PRN Moderate Pain (4 - 6)  sodium chloride 0.65% Nasal 1 Spray(s) Both Nostrils daily PRN Nasal Congestion      Allergies    No Known Drug Allergies  shellfish (Diarrhea)    Intolerances        REVIEW OF SYSTEM:  CONSTITUTIONAL: No fever, No change in weight, No fatigue  HEAD: No headache, No dizziness, No recent trauma  EYES: No eye pain, No visual disturbances, No discharge  ENT:  No difficulty hearing, No tinnitus, No vertigo, No sinus pain, No throat pain  NECK: No pain, No stiffness  BREASTS: No pain, No masses, No nipple discharge  RESPIRATORY: No cough, No wheezing, No chills, No hemoptysis, No shortness of breath at rest or exertional shortness of breath  CARDIOVASCULAR: No chest pain, No palpitations, No dizziness, No CHF, No arrhythmia, No cardiomegaly, No leg swelling  GASTROINTESTINAL: No abdominal, No epigastric pain. No nausea, No vomiting, No hematemesis, No diarrhea, No constipation. No melena, No hematochezia. No GERD  GENITOURINARY: No dysuria, No frequency, No hematuria, No incontinence, No nocturia, No hesitancy,  SKIN: No itching, No burning, No rashes, No lesions   LYMPH NODES: No history of enlarged glands  ENDOCRINE: No heat or cold intolerance, No hair loss. No osteoporosis, No thyroid disease  MUSCULOSKELETAL: No joint pain or swelling, No muscle, back, or extremity pain  PSYCHIATRIC: No depression, No anxiety, No mood swings, No difficulty sleeping  HEME/LYMPH: No easy bruising, No anticoagulants, No bleeding disorder, No bleeding gums  ALLERGY AND IMMUNOLOGIC: No hives, No eczema  NEUROLOGICAL: No memory loss, No loss of strength, No numbness, No tremors        VITALS:   T(C): 36.8 (12-09-17 @ 21:25), Max: 37 (12-09-17 @ 00:56)  HR: 79 (12-09-17 @ 21:25) (70 - 83)  BP: 145/69 (12-09-17 @ 21:25) (137/74 - 153/67)  RR: 18 (12-09-17 @ 21:25) (16 - 18)  SpO2: 93% (12-09-17 @ 21:25) (93% - 96%)  Wt(kg): --    12-08 @ 07:01  -  12-09 @ 07:00  --------------------------------------------------------  IN: 620 mL / OUT: 900 mL / NET: -280 mL    12-09 @ 07:01  -  12-10 @ 00:00  --------------------------------------------------------  IN: 1510 mL / OUT: 800 mL / NET: 710 mL        PHYSICAL EXAM:  GENERAL: NAD, well nourished and conversant  HEAD:  Atraumatic  EYES: EOM, PERRLA, conjunctiva pink and sclera white  ENT: No tonsillar erythema, exudates, or enlargement, moist mucous membranes, good dentition, no lesions  NECK: Supple, No JVD, normal thyroid, carotids with normal upstrokes and no bruits  CHEST/LUNG: Clear to auscultation bilaterally, No rales, rhonchi, wheezing, or rubs  HEART: Regular rate and rhythm, No murmurs, rubs, or gallops  ABDOMEN: Soft, nondistended, no masses, guarding, tenderness or rebound, bowel sounds present  EXTREMITIES:  2+ Peripheral Pulses, No clubbing, cyanosis, or edema. No arthritis of shoulders, elbows, hands, hips, knees, ankles, or feet. No DJD C spine, T spine, or L/S spine  LYMPH: No lymphadenopathy noted  SKIN: No rashes or lesions  NERVOUS SYSTEM:  Alert & Oriented X3, normal cognitive function. Motor Strength 5/5 right upper and right lower.  5/5 left upper and left lower extremities, DTRs 2+ intact and symmetric    LABS:                          10.4   9.4   )-----------( 385      ( 09 Dec 2017 04:33 )             31.1     12-09    138  |  99  |  39<H>  ----------------------------<  106<H>  4.2   |  26  |  1.40<H>  12-08    142  |  101  |  29<H>  ----------------------------<  104<H>  4.0   |  25  |  1.06  12-07    140  |  103  |  28<H>  ----------------------------<  124<H>  4.1   |  23  |  1.10    Ca    9.1      09 Dec 2017 04:33  Ca    9.2      08 Dec 2017 08:34  Ca    9.0      07 Dec 2017 22:09    TPro  6.3  /  Alb  3.4  /  TBili  0.4  /  DBili  x   /  AST  25  /  ALT  14  /  AlkPhos  104  12-07    CAPILLARY BLOOD GLUCOSE          RADIOLOGY & ADDITIONAL TESTS:      Consultant(s):    Care Discussed with Consultants/Other Providers [ ] YES  [ ] NO Patient is a 87y old  Female who presents with a chief complaint of left hip dislocation (07 Dec 2017 22:35)      HPI:  Comfortable . Pain level 2/10 when she moves.  Need to encourage incentive spirometry.  Get patient OOB to Chair and PT    MEDICATIONS  (STANDING):  amLODIPine   Tablet 5 milliGRAM(s) Oral daily  artificial  tears Solution 1 Drop(s) Both EYES daily  buDESOnide   0.5 milliGRAM(s) Respule 0.5 milliGRAM(s) Inhalation two times a day  dronedarone 400 milliGRAM(s) Oral two times a day  famotidine    Tablet 20 milliGRAM(s) Oral every other day  furosemide    Tablet 20 milliGRAM(s) Oral every other day  lactated ringers. 1000 milliLiter(s) (75 mL/Hr) IV Continuous <Continuous>  levothyroxine 25 MICROGram(s) Oral daily  tiotropium 18 MICROgram(s) Capsule 1 Capsule(s) Inhalation daily  venlafaxine 75 milliGRAM(s) Oral two times a day with meals    MEDICATIONS  (PRN):  acetaminophen   Tablet 650 milliGRAM(s) Oral every 6 hours PRN For Temp greater than 38 C (100.4 F)  acetaminophen   Tablet. 325 milliGRAM(s) Oral every 4 hours PRN Mild Pain (1 - 3)  ALBUTerol    90 MICROgram(s) HFA Inhaler 2 Puff(s) Inhalation every 6 hours PRN Bronchospasm  ALPRAZolam 0.25 milliGRAM(s) Oral at bedtime PRN sleep/anxiety  HYDROmorphone  Injectable 1 milliGRAM(s) IV Push every 4 hours PRN breakthrough pain  oxyCODONE    IR 10 milliGRAM(s) Oral every 4 hours PRN Severe Pain (7 - 10)  oxyCODONE    IR 5 milliGRAM(s) Oral every 4 hours PRN Moderate Pain (4 - 6)  sodium chloride 0.65% Nasal 1 Spray(s) Both Nostrils daily PRN Nasal Congestion      Allergies    No Known Drug Allergies  shellfish (Diarrhea)    Intolerances        REVIEW OF SYSTEM:  CONSTITUTIONAL: No fever, No change in weight, No fatigue  HEAD: No headache, No dizziness, No recent trauma  EYES: No eye pain, No visual disturbances, No discharge  ENT:  No difficulty hearing, No tinnitus, No vertigo, No sinus pain, No throat pain  NECK: No pain, No stiffness  BREASTS: No pain, No masses, No nipple discharge  RESPIRATORY: No cough, No wheezing, No chills, No hemoptysis, No shortness of breath at rest or exertional shortness of breath  CARDIOVASCULAR: No chest pain, No palpitations, No dizziness, No CHF, No arrhythmia, No cardiomegaly, No leg swelling  GASTROINTESTINAL: No abdominal, No epigastric pain. No nausea, No vomiting, No hematemesis, No diarrhea, No constipation. No melena, No hematochezia. No GERD  GENITOURINARY: No dysuria, No frequency, No hematuria, No incontinence, No nocturia, No hesitancy,  SKIN: No itching, No burning, No rashes, No lesions   LYMPH NODES: No history of enlarged glands  ENDOCRINE: No heat or cold intolerance, No hair loss. No osteoporosis, No thyroid disease  MUSCULOSKELETAL: Hip pain tolerable  PSYCHIATRIC: No depression, No anxiety, No mood swings, No difficulty sleeping  HEME/LYMPH: No easy bruising, No anticoagulants, No bleeding disorder, No bleeding gums  ALLERGY AND IMMUNOLOGIC: No hives, No eczema  NEUROLOGICAL: No memory loss, No loss of strength, No numbness, No tremors        VITALS:   T(C): 36.8 (12-09-17 @ 21:25), Max: 37 (12-09-17 @ 00:56)  HR: 79 (12-09-17 @ 21:25) (70 - 83)  BP: 145/69 (12-09-17 @ 21:25) (137/74 - 153/67)  RR: 18 (12-09-17 @ 21:25) (16 - 18)  SpO2: 93% (12-09-17 @ 21:25) (93% - 96%)  Wt(kg): --    12-08 @ 07:01  -  12-09 @ 07:00  --------------------------------------------------------  IN: 620 mL / OUT: 900 mL / NET: -280 mL    12-09 @ 07:01  -  12-10 @ 00:00  --------------------------------------------------------  IN: 1510 mL / OUT: 800 mL / NET: 710 mL        PHYSICAL EXAM:  GENERAL: NAD, well nourished and conversant  HEAD:  Atraumatic  EYES: EOM, PERRLA, conjunctiva pink and sclera white  ENT: No tonsillar erythema, exudates, or enlargement, moist mucous membranes, good dentition, no lesions  NECK: Supple, No JVD, normal thyroid, carotids with normal upstrokes and no bruits  CHEST/LUNG: Clear to auscultation bilaterally, No rales, rhonchi, wheezing, or rubs  HEART: Regular rate and rhythm, No murmurs, rubs, or gallops  ABDOMEN: Soft, nondistended, no masses, guarding, tenderness or rebound, bowel sounds present  EXTREMITIES:  2+ Peripheral Pulses, No clubbing, cyanosis, or edema.  (+) pain due do Hip surgery tolerable,. Wound clean naddry  LYMPH: No lymphadenopathy noted  SKIN: No rashes or lesions  NERVOUS SYSTEM:  Alert & Oriented X3, normal cognitive function. Motor Strength 5/5 right upper and right lower.  5/5 left upper and left lower extremities, DTRs 2+ intact and symmetric    LABS:                          10.4   9.4   )-----------( 385      ( 09 Dec 2017 04:33 )             31.1     12-09    138  |  99  |  39<H>  ----------------------------<  106<H>  4.2   |  26  |  1.40<H>  12-08    142  |  101  |  29<H>  ----------------------------<  104<H>  4.0   |  25  |  1.06  12-07    140  |  103  |  28<H>  ----------------------------<  124<H>  4.1   |  23  |  1.10    Ca    9.1      09 Dec 2017 04:33  Ca    9.2      08 Dec 2017 08:34  Ca    9.0      07 Dec 2017 22:09    TPro  6.3  /  Alb  3.4  /  TBili  0.4  /  DBili  x   /  AST  25  /  ALT  14  /  AlkPhos  104  12-07    CAPILLARY BLOOD GLUCOSE          RADIOLOGY & ADDITIONAL TESTS:      Consultant(s):    Care Discussed with Consultants/Other Providers [ ] YES  [ ] NO

## 2017-12-10 DIAGNOSIS — N28.9 DISORDER OF KIDNEY AND URETER, UNSPECIFIED: ICD-10-CM

## 2017-12-10 DIAGNOSIS — R79.82 ELEVATED C-REACTIVE PROTEIN (CRP): ICD-10-CM

## 2017-12-10 LAB
ANION GAP SERPL CALC-SCNC: 15 MMOL/L — SIGNIFICANT CHANGE UP (ref 5–17)
BUN SERPL-MCNC: 45 MG/DL — HIGH (ref 7–23)
CALCIUM SERPL-MCNC: 9.2 MG/DL — SIGNIFICANT CHANGE UP (ref 8.4–10.5)
CHLORIDE SERPL-SCNC: 98 MMOL/L — SIGNIFICANT CHANGE UP (ref 96–108)
CO2 SERPL-SCNC: 24 MMOL/L — SIGNIFICANT CHANGE UP (ref 22–31)
CREAT SERPL-MCNC: 1.51 MG/DL — HIGH (ref 0.5–1.3)
GLUCOSE SERPL-MCNC: 104 MG/DL — HIGH (ref 70–99)
HCT VFR BLD CALC: 31.2 % — LOW (ref 34.5–45)
HGB BLD-MCNC: 10.4 G/DL — LOW (ref 11.5–15.5)
INR BLD: 1.4 RATIO — HIGH (ref 0.88–1.16)
MCHC RBC-ENTMCNC: 29.2 PG — SIGNIFICANT CHANGE UP (ref 27–34)
MCHC RBC-ENTMCNC: 33.3 GM/DL — SIGNIFICANT CHANGE UP (ref 32–36)
MCV RBC AUTO: 87.6 FL — SIGNIFICANT CHANGE UP (ref 80–100)
PLATELET # BLD AUTO: 422 K/UL — HIGH (ref 150–400)
POTASSIUM SERPL-MCNC: 4.2 MMOL/L — SIGNIFICANT CHANGE UP (ref 3.5–5.3)
POTASSIUM SERPL-SCNC: 4.2 MMOL/L — SIGNIFICANT CHANGE UP (ref 3.5–5.3)
PROTHROM AB SERPL-ACNC: 15.9 SEC — HIGH (ref 10–13.1)
RBC # BLD: 3.56 M/UL — LOW (ref 3.8–5.2)
RBC # FLD: 14.6 % — HIGH (ref 10.3–14.5)
SODIUM SERPL-SCNC: 137 MMOL/L — SIGNIFICANT CHANGE UP (ref 135–145)
WBC # BLD: 10.22 K/UL — SIGNIFICANT CHANGE UP (ref 3.8–10.5)
WBC # FLD AUTO: 10.22 K/UL — SIGNIFICANT CHANGE UP (ref 3.8–10.5)

## 2017-12-10 RX ORDER — SODIUM CHLORIDE 9 MG/ML
1000 INJECTION INTRAMUSCULAR; INTRAVENOUS; SUBCUTANEOUS
Qty: 0 | Refills: 0 | Status: DISCONTINUED | OUTPATIENT
Start: 2017-12-10 | End: 2017-12-11

## 2017-12-10 RX ADMIN — FAMOTIDINE 20 MILLIGRAM(S): 10 INJECTION INTRAVENOUS at 11:43

## 2017-12-10 RX ADMIN — TIOTROPIUM BROMIDE 1 CAPSULE(S): 18 CAPSULE ORAL; RESPIRATORY (INHALATION) at 11:44

## 2017-12-10 RX ADMIN — Medication 75 MILLIGRAM(S): at 17:22

## 2017-12-10 RX ADMIN — Medication 0.5 MILLIGRAM(S): at 17:22

## 2017-12-10 RX ADMIN — Medication 1 DROP(S): at 11:43

## 2017-12-10 RX ADMIN — Medication 20 MILLIGRAM(S): at 11:44

## 2017-12-10 RX ADMIN — AMLODIPINE BESYLATE 5 MILLIGRAM(S): 2.5 TABLET ORAL at 06:31

## 2017-12-10 RX ADMIN — DRONEDARONE 400 MILLIGRAM(S): 400 TABLET, FILM COATED ORAL at 06:31

## 2017-12-10 RX ADMIN — Medication 0.5 MILLIGRAM(S): at 06:55

## 2017-12-10 RX ADMIN — Medication 325 MILLIGRAM(S): at 00:44

## 2017-12-10 RX ADMIN — DRONEDARONE 400 MILLIGRAM(S): 400 TABLET, FILM COATED ORAL at 17:22

## 2017-12-10 RX ADMIN — Medication 25 MICROGRAM(S): at 06:31

## 2017-12-10 RX ADMIN — Medication 75 MILLIGRAM(S): at 07:04

## 2017-12-10 RX ADMIN — Medication 325 MILLIGRAM(S): at 00:14

## 2017-12-10 NOTE — PROGRESS NOTE ADULT - SUBJECTIVE AND OBJECTIVE BOX
Patient is a 87y old  Female who presents with a chief complaint of left hip dislocation (07 Dec 2017 22:35)      HPI:  Patient is scheduled for left ROB on 12/11.  Awaiting coags to normalize.   Patient resting comfortably. Denies SOB or chest pain.    MEDICATIONS  (STANDING):  amLODIPine   Tablet 5 milliGRAM(s) Oral daily  artificial  tears Solution 1 Drop(s) Both EYES daily  buDESOnide   0.5 milliGRAM(s) Respule 0.5 milliGRAM(s) Inhalation two times a day  dronedarone 400 milliGRAM(s) Oral two times a day  famotidine    Tablet 20 milliGRAM(s) Oral every other day  furosemide    Tablet 20 milliGRAM(s) Oral every other day  lactated ringers. 1000 milliLiter(s) (75 mL/Hr) IV Continuous <Continuous>  levothyroxine 25 MICROGram(s) Oral daily  sodium chloride 0.9%. 1000 milliLiter(s) (100 mL/Hr) IV Continuous <Continuous>  tiotropium 18 MICROgram(s) Capsule 1 Capsule(s) Inhalation daily  venlafaxine 75 milliGRAM(s) Oral two times a day with meals    MEDICATIONS  (PRN):  acetaminophen   Tablet 650 milliGRAM(s) Oral every 6 hours PRN For Temp greater than 38 C (100.4 F)  acetaminophen   Tablet. 325 milliGRAM(s) Oral every 4 hours PRN Mild Pain (1 - 3)  ALBUTerol    90 MICROgram(s) HFA Inhaler 2 Puff(s) Inhalation every 6 hours PRN Bronchospasm  ALPRAZolam 0.25 milliGRAM(s) Oral at bedtime PRN sleep/anxiety  HYDROmorphone  Injectable 1 milliGRAM(s) IV Push every 4 hours PRN breakthrough pain  oxyCODONE    IR 10 milliGRAM(s) Oral every 4 hours PRN Severe Pain (7 - 10)  oxyCODONE    IR 5 milliGRAM(s) Oral every 4 hours PRN Moderate Pain (4 - 6)  sodium chloride 0.65% Nasal 1 Spray(s) Both Nostrils daily PRN Nasal Congestion      Allergies    No Known Drug Allergies  shellfish (Diarrhea)    Intolerances        REVIEW OF SYSTEM:  CONSTITUTIONAL: No fever, No change in weight, No fatigue  HEAD: No headache, No dizziness, No recent trauma  EYES: No eye pain, No visual disturbances, No discharge  ENT:  No difficulty hearing, No tinnitus, No vertigo, No sinus pain, No throat pain  NECK: No pain, No stiffness  BREASTS: No pain, No masses, No nipple discharge  RESPIRATORY: No cough, No wheezing, No chills, No hemoptysis, No shortness of breath at rest or exertional shortness of breath  CARDIOVASCULAR: No chest pain, No palpitations, No dizziness, No CHF, No arrhythmia, No cardiomegaly, No leg swelling  GASTROINTESTINAL: No abdominal, No epigastric pain. No nausea, No vomiting, No hematemesis, No diarrhea, No constipation. No melena, No hematochezia. No GERD  GENITOURINARY: No dysuria, No frequency, No hematuria, No incontinence, No nocturia, No hesitancy,  SKIN: No itching, No burning, No rashes, No lesions   LYMPH NODES: No history of enlarged glands  ENDOCRINE: No heat or cold intolerance, No hair loss. No osteoporosis, No thyroid disease  MUSCULOSKELETAL: (+) left hip joint pain and swelling,  PSYCHIATRIC: No depression, No anxiety, No mood swings, No difficulty sleeping  HEME/LYMPH: No easy bruising, No anticoagulants, No bleeding disorder, No bleeding gums  ALLERGY AND IMMUNOLOGIC: No hives, No eczema  NEUROLOGICAL: No memory loss, No loss of strength, No numbness, No tremors        VITALS:   T(C): 36.3 (12-10-17 @ 16:05), Max: 37.1 (12-10-17 @ 00:12)  HR: 77 (12-10-17 @ 16:05) (72 - 81)  BP: 133/69 (12-10-17 @ 16:05) (125/68 - 167/70)  RR: 18 (12-10-17 @ 16:05) (18 - 18)  SpO2: 94% (12-10-17 @ 16:05) (93% - 95%)  Wt(kg): --    12-09 @ 07:01  -  12-10 @ 07:00  --------------------------------------------------------  IN: 1510 mL / OUT: 1250 mL / NET: 260 mL    12-10 @ 07:01  -  12-10 @ 16:55  --------------------------------------------------------  IN: 540 mL / OUT: 400 mL / NET: 140 mL        PHYSICAL EXAM:  GENERAL: NAD, well nourished and conversant  HEAD:  Atraumatic  EYES: EOM, PERRLA, conjunctiva pink and sclera white  ENT: No tonsillar erythema, exudates, or enlargement, moist mucous membranes, good dentition, no lesions  NECK: Supple, No JVD, normal thyroid, carotids with normal upstrokes and no bruits  CHEST/LUNG: Clear to auscultation bilaterally, No rales, rhonchi, wheezing, or rubs  HEART: Regular rate and rhythm, No murmurs, rubs, or gallops  ABDOMEN: Soft, nondistended, no masses, guarding, tenderness or rebound, bowel sounds present  EXTREMITIES:  2+ Peripheral Pulses, No clubbing, cyanosis, or edema.  (+) painful left hip scheduled for ROB  LYMPH: No lymphadenopathy noted  SKIN: No rashes or lesions  NERVOUS SYSTEM:  Alert & Oriented X3, normal cognitive function. Motor Strength 5/5 right upper and right lower.  5/5 left upper and left lower extremities, DTRs 2+ intact and symmetric    LABS:                          10.4   10.22 )-----------( 422      ( 10 Dec 2017 06:08 )             31.2     12-10    137  |  98  |  45<H>  ----------------------------<  104<H>  4.2   |  24  |  1.51<H>  12-09    138  |  99  |  39<H>  ----------------------------<  106<H>  4.2   |  26  |  1.40<H>  12-08    142  |  101  |  29<H>  ----------------------------<  104<H>  4.0   |  25  |  1.06    Ca    9.2      10 Dec 2017 08:31  Ca    9.1      09 Dec 2017 04:33  Ca    9.2      08 Dec 2017 08:34    TPro  6.3  /  Alb  3.4  /  TBili  0.4  /  DBili  x   /  AST  25  /  ALT  14  /  AlkPhos  104  12-07    CAPILLARY BLOOD GLUCOSE          RADIOLOGY & ADDITIONAL TESTS:      Consultant(s):    Care Discussed with Consultants/Other Providers [ ] YES  [ ] NO

## 2017-12-10 NOTE — PROGRESS NOTE ADULT - SUBJECTIVE AND OBJECTIVE BOX
12/10/17    Vital Signs Last 24 Hrs  T(C): 36.8 (10 Dec 2017 04:58), Max: 37.1 (10 Dec 2017 00:12)  T(F): 98.2 (10 Dec 2017 04:58), Max: 98.7 (10 Dec 2017 00:12)  HR: 74 (10 Dec 2017 04:58) (70 - 82)  BP: 167/70 (10 Dec 2017 04:58) (137/74 - 167/70)  BP(mean): --  RR: 18 (10 Dec 2017 04:58) (16 - 18)  SpO2: 93% (10 Dec 2017 04:58) (93% - 95%)    LLE N/V Intact

## 2017-12-10 NOTE — PROGRESS NOTE ADULT - PROBLEM SELECTOR PLAN 6
Inflammatory markers elevated  CRP and ESR. Cause uncertain.  She has NO fever leucocytosis or evidence of cellulitis or uti.. Culture of the joint showed no infection .  Blood cultures x2 and Urine cx negative.

## 2017-12-11 LAB
ANION GAP SERPL CALC-SCNC: 15 MMOL/L — SIGNIFICANT CHANGE UP (ref 5–17)
ANION GAP SERPL CALC-SCNC: 16 MMOL/L — SIGNIFICANT CHANGE UP (ref 5–17)
APTT BLD: 30.8 SEC — SIGNIFICANT CHANGE UP (ref 27.5–37.4)
APTT BLD: 34.2 SEC — SIGNIFICANT CHANGE UP (ref 27.5–37.4)
BLD GP AB SCN SERPL QL: NEGATIVE — SIGNIFICANT CHANGE UP
BUN SERPL-MCNC: 35 MG/DL — HIGH (ref 7–23)
BUN SERPL-MCNC: 41 MG/DL — HIGH (ref 7–23)
CALCIUM SERPL-MCNC: 8.8 MG/DL — SIGNIFICANT CHANGE UP (ref 8.4–10.5)
CALCIUM SERPL-MCNC: 9.4 MG/DL — SIGNIFICANT CHANGE UP (ref 8.4–10.5)
CHLORIDE SERPL-SCNC: 102 MMOL/L — SIGNIFICANT CHANGE UP (ref 96–108)
CHLORIDE SERPL-SCNC: 99 MMOL/L — SIGNIFICANT CHANGE UP (ref 96–108)
CO2 SERPL-SCNC: 22 MMOL/L — SIGNIFICANT CHANGE UP (ref 22–31)
CO2 SERPL-SCNC: 25 MMOL/L — SIGNIFICANT CHANGE UP (ref 22–31)
CREAT SERPL-MCNC: 1.33 MG/DL — HIGH (ref 0.5–1.3)
CREAT SERPL-MCNC: 1.39 MG/DL — HIGH (ref 0.5–1.3)
GLUCOSE SERPL-MCNC: 79 MG/DL — SIGNIFICANT CHANGE UP (ref 70–99)
GLUCOSE SERPL-MCNC: 98 MG/DL — SIGNIFICANT CHANGE UP (ref 70–99)
HCT VFR BLD CALC: 32.9 % — LOW (ref 34.5–45)
HCT VFR BLD CALC: 33.1 % — LOW (ref 34.5–45)
HGB BLD-MCNC: 10.8 G/DL — LOW (ref 11.5–15.5)
HGB BLD-MCNC: 11 G/DL — LOW (ref 11.5–15.5)
INR BLD: 1.18 RATIO — HIGH (ref 0.88–1.16)
INR BLD: 1.29 RATIO — HIGH (ref 0.88–1.16)
MCHC RBC-ENTMCNC: 28.7 PG — SIGNIFICANT CHANGE UP (ref 27–34)
MCHC RBC-ENTMCNC: 31.2 PG — SIGNIFICANT CHANGE UP (ref 27–34)
MCHC RBC-ENTMCNC: 32.6 GM/DL — SIGNIFICANT CHANGE UP (ref 32–36)
MCHC RBC-ENTMCNC: 33.4 GM/DL — SIGNIFICANT CHANGE UP (ref 32–36)
MCV RBC AUTO: 88 FL — SIGNIFICANT CHANGE UP (ref 80–100)
MCV RBC AUTO: 93.2 FL — SIGNIFICANT CHANGE UP (ref 80–100)
PLATELET # BLD AUTO: 449 K/UL — HIGH (ref 150–400)
PLATELET # BLD AUTO: 458 K/UL — HIGH (ref 150–400)
POTASSIUM SERPL-MCNC: 4 MMOL/L — SIGNIFICANT CHANGE UP (ref 3.5–5.3)
POTASSIUM SERPL-MCNC: 4.1 MMOL/L — SIGNIFICANT CHANGE UP (ref 3.5–5.3)
POTASSIUM SERPL-SCNC: 4 MMOL/L — SIGNIFICANT CHANGE UP (ref 3.5–5.3)
POTASSIUM SERPL-SCNC: 4.1 MMOL/L — SIGNIFICANT CHANGE UP (ref 3.5–5.3)
PROTHROM AB SERPL-ACNC: 13.4 SEC — HIGH (ref 10–13.1)
PROTHROM AB SERPL-ACNC: 14 SEC — HIGH (ref 9.8–12.7)
RBC # BLD: 3.53 M/UL — LOW (ref 3.8–5.2)
RBC # BLD: 3.76 M/UL — LOW (ref 3.8–5.2)
RBC # FLD: 13 % — SIGNIFICANT CHANGE UP (ref 10.3–14.5)
RBC # FLD: 14.5 % — SIGNIFICANT CHANGE UP (ref 10.3–14.5)
RH IG SCN BLD-IMP: POSITIVE — SIGNIFICANT CHANGE UP
SODIUM SERPL-SCNC: 139 MMOL/L — SIGNIFICANT CHANGE UP (ref 135–145)
SODIUM SERPL-SCNC: 140 MMOL/L — SIGNIFICANT CHANGE UP (ref 135–145)
WBC # BLD: 6.88 K/UL — SIGNIFICANT CHANGE UP (ref 3.8–10.5)
WBC # BLD: 7.6 K/UL — SIGNIFICANT CHANGE UP (ref 3.8–10.5)
WBC # FLD AUTO: 6.88 K/UL — SIGNIFICANT CHANGE UP (ref 3.8–10.5)
WBC # FLD AUTO: 7.6 K/UL — SIGNIFICANT CHANGE UP (ref 3.8–10.5)

## 2017-12-11 PROCEDURE — 72170 X-RAY EXAM OF PELVIS: CPT | Mod: 26

## 2017-12-11 PROCEDURE — 27134 REVISE HIP JOINT REPLACEMENT: CPT | Mod: LT

## 2017-12-11 RX ORDER — TIOTROPIUM BROMIDE 18 UG/1
1 CAPSULE ORAL; RESPIRATORY (INHALATION) DAILY
Qty: 0 | Refills: 0 | Status: DISCONTINUED | OUTPATIENT
Start: 2017-12-11 | End: 2017-12-13

## 2017-12-11 RX ORDER — SODIUM CHLORIDE 9 MG/ML
1000 INJECTION INTRAMUSCULAR; INTRAVENOUS; SUBCUTANEOUS ONCE
Qty: 0 | Refills: 0 | Status: COMPLETED | OUTPATIENT
Start: 2017-12-12 | End: 2017-12-12

## 2017-12-11 RX ORDER — LEVOTHYROXINE SODIUM 125 MCG
25 TABLET ORAL DAILY
Qty: 0 | Refills: 0 | Status: DISCONTINUED | OUTPATIENT
Start: 2017-12-11 | End: 2017-12-13

## 2017-12-11 RX ORDER — TRAMADOL HYDROCHLORIDE 50 MG/1
50 TABLET ORAL EVERY 8 HOURS
Qty: 0 | Refills: 0 | Status: DISCONTINUED | OUTPATIENT
Start: 2017-12-11 | End: 2017-12-13

## 2017-12-11 RX ORDER — FUROSEMIDE 40 MG
20 TABLET ORAL EVERY OTHER DAY
Qty: 0 | Refills: 0 | Status: DISCONTINUED | OUTPATIENT
Start: 2017-12-11 | End: 2017-12-13

## 2017-12-11 RX ORDER — DRONEDARONE 400 MG/1
400 TABLET, FILM COATED ORAL
Qty: 0 | Refills: 0 | Status: DISCONTINUED | OUTPATIENT
Start: 2017-12-11 | End: 2017-12-13

## 2017-12-11 RX ORDER — SODIUM CHLORIDE 9 MG/ML
1000 INJECTION INTRAMUSCULAR; INTRAVENOUS; SUBCUTANEOUS ONCE
Qty: 0 | Refills: 0 | Status: DISCONTINUED | OUTPATIENT
Start: 2017-12-11 | End: 2017-12-11

## 2017-12-11 RX ORDER — SODIUM CHLORIDE 0.65 %
1 AEROSOL, SPRAY (ML) NASAL DAILY
Qty: 0 | Refills: 0 | Status: DISCONTINUED | OUTPATIENT
Start: 2017-12-11 | End: 2017-12-13

## 2017-12-11 RX ORDER — HYDROMORPHONE HYDROCHLORIDE 2 MG/ML
0.25 INJECTION INTRAMUSCULAR; INTRAVENOUS; SUBCUTANEOUS
Qty: 0 | Refills: 0 | Status: DISCONTINUED | OUTPATIENT
Start: 2017-12-11 | End: 2017-12-11

## 2017-12-11 RX ORDER — CEFAZOLIN SODIUM 1 G
2000 VIAL (EA) INJECTION EVERY 8 HOURS
Qty: 0 | Refills: 0 | Status: COMPLETED | OUTPATIENT
Start: 2017-12-11 | End: 2017-12-12

## 2017-12-11 RX ORDER — SENNA PLUS 8.6 MG/1
2 TABLET ORAL AT BEDTIME
Qty: 0 | Refills: 0 | Status: DISCONTINUED | OUTPATIENT
Start: 2017-12-11 | End: 2017-12-13

## 2017-12-11 RX ORDER — DOCUSATE SODIUM 100 MG
100 CAPSULE ORAL THREE TIMES A DAY
Qty: 0 | Refills: 0 | Status: DISCONTINUED | OUTPATIENT
Start: 2017-12-11 | End: 2017-12-13

## 2017-12-11 RX ORDER — POLYETHYLENE GLYCOL 3350 17 G/17G
17 POWDER, FOR SOLUTION ORAL DAILY
Qty: 0 | Refills: 0 | Status: DISCONTINUED | OUTPATIENT
Start: 2017-12-11 | End: 2017-12-13

## 2017-12-11 RX ORDER — HYDROMORPHONE HYDROCHLORIDE 2 MG/ML
1 INJECTION INTRAMUSCULAR; INTRAVENOUS; SUBCUTANEOUS EVERY 4 HOURS
Qty: 0 | Refills: 0 | Status: DISCONTINUED | OUTPATIENT
Start: 2017-12-11 | End: 2017-12-13

## 2017-12-11 RX ORDER — BUDESONIDE, MICRONIZED 100 %
0.5 POWDER (GRAM) MISCELLANEOUS
Qty: 0 | Refills: 0 | Status: DISCONTINUED | OUTPATIENT
Start: 2017-12-11 | End: 2017-12-13

## 2017-12-11 RX ORDER — WARFARIN SODIUM 2.5 MG/1
4 TABLET ORAL ONCE
Qty: 0 | Refills: 0 | Status: COMPLETED | OUTPATIENT
Start: 2017-12-11 | End: 2017-12-11

## 2017-12-11 RX ORDER — SODIUM CHLORIDE 9 MG/ML
1000 INJECTION INTRAMUSCULAR; INTRAVENOUS; SUBCUTANEOUS
Qty: 0 | Refills: 0 | Status: DISCONTINUED | OUTPATIENT
Start: 2017-12-11 | End: 2017-12-13

## 2017-12-11 RX ORDER — ACETAMINOPHEN 500 MG
650 TABLET ORAL EVERY 6 HOURS
Qty: 0 | Refills: 0 | Status: DISCONTINUED | OUTPATIENT
Start: 2017-12-11 | End: 2017-12-13

## 2017-12-11 RX ORDER — AMLODIPINE BESYLATE 2.5 MG/1
5 TABLET ORAL DAILY
Qty: 0 | Refills: 0 | Status: DISCONTINUED | OUTPATIENT
Start: 2017-12-11 | End: 2017-12-13

## 2017-12-11 RX ORDER — ACETAMINOPHEN 500 MG
650 TABLET ORAL EVERY 6 HOURS
Qty: 0 | Refills: 0 | Status: DISCONTINUED | OUTPATIENT
Start: 2017-12-11 | End: 2017-12-11

## 2017-12-11 RX ORDER — OXYCODONE HYDROCHLORIDE 5 MG/1
10 TABLET ORAL EVERY 4 HOURS
Qty: 0 | Refills: 0 | Status: DISCONTINUED | OUTPATIENT
Start: 2017-12-11 | End: 2017-12-13

## 2017-12-11 RX ORDER — KETOROLAC TROMETHAMINE 30 MG/ML
15 SYRINGE (ML) INJECTION EVERY 8 HOURS
Qty: 0 | Refills: 0 | Status: DISCONTINUED | OUTPATIENT
Start: 2017-12-11 | End: 2017-12-12

## 2017-12-11 RX ORDER — OXYCODONE HYDROCHLORIDE 5 MG/1
5 TABLET ORAL EVERY 4 HOURS
Qty: 0 | Refills: 0 | Status: DISCONTINUED | OUTPATIENT
Start: 2017-12-11 | End: 2017-12-13

## 2017-12-11 RX ORDER — ALPRAZOLAM 0.25 MG
0.25 TABLET ORAL AT BEDTIME
Qty: 0 | Refills: 0 | Status: DISCONTINUED | OUTPATIENT
Start: 2017-12-11 | End: 2017-12-13

## 2017-12-11 RX ORDER — ONDANSETRON 8 MG/1
4 TABLET, FILM COATED ORAL ONCE
Qty: 0 | Refills: 0 | Status: DISCONTINUED | OUTPATIENT
Start: 2017-12-11 | End: 2017-12-11

## 2017-12-11 RX ORDER — ALBUTEROL 90 UG/1
2 AEROSOL, METERED ORAL EVERY 6 HOURS
Qty: 0 | Refills: 0 | Status: DISCONTINUED | OUTPATIENT
Start: 2017-12-11 | End: 2017-12-13

## 2017-12-11 RX ORDER — VENLAFAXINE HCL 75 MG
75 CAPSULE, EXT RELEASE 24 HR ORAL
Qty: 0 | Refills: 0 | Status: DISCONTINUED | OUTPATIENT
Start: 2017-12-11 | End: 2017-12-13

## 2017-12-11 RX ORDER — FAMOTIDINE 10 MG/ML
20 INJECTION INTRAVENOUS EVERY OTHER DAY
Qty: 0 | Refills: 0 | Status: DISCONTINUED | OUTPATIENT
Start: 2017-12-11 | End: 2017-12-13

## 2017-12-11 RX ORDER — MAGNESIUM HYDROXIDE 400 MG/1
30 TABLET, CHEWABLE ORAL DAILY
Qty: 0 | Refills: 0 | Status: DISCONTINUED | OUTPATIENT
Start: 2017-12-11 | End: 2017-12-13

## 2017-12-11 RX ORDER — ONDANSETRON 8 MG/1
4 TABLET, FILM COATED ORAL EVERY 6 HOURS
Qty: 0 | Refills: 0 | Status: DISCONTINUED | OUTPATIENT
Start: 2017-12-11 | End: 2017-12-11

## 2017-12-11 RX ADMIN — AMLODIPINE BESYLATE 5 MILLIGRAM(S): 2.5 TABLET ORAL at 23:04

## 2017-12-11 RX ADMIN — Medication 1 DROP(S): at 11:09

## 2017-12-11 RX ADMIN — TIOTROPIUM BROMIDE 1 CAPSULE(S): 18 CAPSULE ORAL; RESPIRATORY (INHALATION) at 11:39

## 2017-12-11 RX ADMIN — WARFARIN SODIUM 4 MILLIGRAM(S): 2.5 TABLET ORAL at 23:04

## 2017-12-11 RX ADMIN — Medication 75 MILLIGRAM(S): at 23:04

## 2017-12-11 RX ADMIN — Medication 75 MILLIGRAM(S): at 08:45

## 2017-12-11 RX ADMIN — Medication 15 MILLIGRAM(S): at 23:15

## 2017-12-11 RX ADMIN — Medication 1 SPRAY(S): at 13:28

## 2017-12-11 RX ADMIN — Medication 25 MICROGRAM(S): at 18:47

## 2017-12-11 RX ADMIN — Medication 20 MILLIGRAM(S): at 19:05

## 2017-12-11 RX ADMIN — AMLODIPINE BESYLATE 5 MILLIGRAM(S): 2.5 TABLET ORAL at 06:39

## 2017-12-11 RX ADMIN — Medication 100 MILLIGRAM(S): at 23:04

## 2017-12-11 RX ADMIN — Medication 650 MILLIGRAM(S): at 23:05

## 2017-12-11 RX ADMIN — TRAMADOL HYDROCHLORIDE 50 MILLIGRAM(S): 50 TABLET ORAL at 23:15

## 2017-12-11 RX ADMIN — Medication 15 MILLIGRAM(S): at 23:05

## 2017-12-11 RX ADMIN — DRONEDARONE 400 MILLIGRAM(S): 400 TABLET, FILM COATED ORAL at 06:39

## 2017-12-11 RX ADMIN — TRAMADOL HYDROCHLORIDE 50 MILLIGRAM(S): 50 TABLET ORAL at 23:04

## 2017-12-11 RX ADMIN — Medication 25 MICROGRAM(S): at 06:39

## 2017-12-11 RX ADMIN — SODIUM CHLORIDE 100 MILLILITER(S): 9 INJECTION INTRAMUSCULAR; INTRAVENOUS; SUBCUTANEOUS at 18:24

## 2017-12-11 RX ADMIN — DRONEDARONE 400 MILLIGRAM(S): 400 TABLET, FILM COATED ORAL at 18:46

## 2017-12-11 RX ADMIN — Medication 1 DROP(S): at 18:48

## 2017-12-11 RX ADMIN — Medication 0.5 MILLIGRAM(S): at 06:41

## 2017-12-11 NOTE — CHART NOTE - NSCHARTNOTEFT_GEN_A_CORE
Resting without complaints.  No Chest Pain, SOB, N/V.    T(C): 36.4 (12-11-17 @ 17:10), Max: 36.7 (12-11-17 @ 00:10)  HR: 66 (12-11-17 @ 18:15) (62 - 81)  BP: 148/67 (12-11-17 @ 18:15) (109/53 - 155/73)  RR: 14 (12-11-17 @ 18:00) (14 - 18)  SpO2: 96% (12-11-17 @ 18:15) (92% - 100%)  Wt(kg): --    Exam:  Alert and [place], No Acute Distress  Card: +S1/S2, RRR, hx afib currently in NSR  Pulm: CTAB  Lt hip dsg c/d/i with soft/compressible compartments  Calves soft, non-tender bilaterally  +PF/DF/EHL/FHL  SILT  + DP pulse    Xray:Intact bilat hardware               Labs pending    A/P: 87yFemale S/p L hip revision-placement of constrained liner.  VSS. NAD  -PT/OT-WBAT With Posterior Hip Precautions  -IS  -DVT PPx  -Pain Control  -Continue Current Tx  -FU pending labs    Roberto Hernandez PA-C  Team Pager #[phone]

## 2017-12-11 NOTE — BRIEF OPERATIVE NOTE - PROCEDURE
<<-----Click on this checkbox to enter Procedure Minor revision of total hip arthroplasty  12/11/2017  exchange of left constrained liner  Active  PGOLD2

## 2017-12-11 NOTE — PROGRESS NOTE ADULT - SUBJECTIVE AND OBJECTIVE BOX
Patient seen and examined at bedside. Pain controlled, resting comfortably in bed. No events overnight.    Vital Signs Last 24 Hrs  T(C): 36.4 (11 Dec 2017 04:46), Max: 36.8 (10 Dec 2017 09:38)  T(F): 97.6 (11 Dec 2017 04:46), Max: 98.2 (10 Dec 2017 09:38)  HR: 75 (11 Dec 2017 04:46) (72 - 81)  BP: 155/73 (11 Dec 2017 04:46) (125/68 - 155/73)  BP(mean): --  RR: 18 (11 Dec 2017 04:46) (18 - 18)  SpO2: 94% (11 Dec 2017 04:46) (92% - 94%)    Gen: Awake, alert, NAD    LLE  -skin intact, in abduction pillow and knee immobilizer  -EHL/FHL/TA/GSC +  -SILT L4-S1  -DP/PT pulses 2+  -no calf TTP

## 2017-12-11 NOTE — PROGRESS NOTE ADULT - SUBJECTIVE AND OBJECTIVE BOX
86 yo woman recurrenPatient is a 87y old  Female who presents with a chief complaint of left hip dislocation (07 Dec 2017 22:35)        MEDICATIONS  (STANDING):  amLODIPine   Tablet 5 milliGRAM(s) Oral daily  artificial  tears Solution 1 Drop(s) Both EYES daily  buDESOnide   0.5 milliGRAM(s) Respule 0.5 milliGRAM(s) Inhalation two times a day  dronedarone 400 milliGRAM(s) Oral two times a day  lactated ringers. 1000 milliLiter(s) (75 mL/Hr) IV Continuous <Continuous>  levothyroxine 25 MICROGram(s) Oral daily  sodium chloride 0.9%. 1000 milliLiter(s) (100 mL/Hr) IV Continuous <Continuous>  tiotropium 18 MICROgram(s) Capsule 1 Capsule(s) Inhalation daily  venlafaxine 75 milliGRAM(s) Oral two times a day with meals    MEDICATIONS  (PRN):  acetaminophen   Tablet 650 milliGRAM(s) Oral every 6 hours PRN For Temp greater than 38 C (100.4 F)  acetaminophen   Tablet. 325 milliGRAM(s) Oral every 4 hours PRN Mild Pain (1 - 3)  ALBUTerol    90 MICROgram(s) HFA Inhaler 2 Puff(s) Inhalation every 6 hours PRN Bronchospasm  ALPRAZolam 0.25 milliGRAM(s) Oral at bedtime PRN sleep/anxiety  HYDROmorphone  Injectable 1 milliGRAM(s) IV Push every 4 hours PRN breakthrough pain  oxyCODONE    IR 10 milliGRAM(s) Oral every 4 hours PRN Severe Pain (7 - 10)  oxyCODONE    IR 5 milliGRAM(s) Oral every 4 hours PRN Moderate Pain (4 - 6)  sodium chloride 0.65% Nasal 1 Spray(s) Both Nostrils daily PRN Nasal Congestion    REVIEW OF SYSTEM:  CONSTITUTIONAL: No fever, No change in weight, No fatigue  HEAD: No headache, No dizziness, No recent trauma  EYES: No eye pain, No visual disturbances, No discharge  ENT:  No difficulty hearing, No tinnitus, No vertigo, No sinus pain, No throat pain  NECK: No pain, No stiffness  BREASTS: No pain, No masses, No nipple discharge  RESPIRATORY: No cough, No wheezing, No chills, No hemoptysis, No shortness of breath at rest or exertional shortness of breath  CARDIOVASCULAR: No chest pain, No palpitations, No dizziness, No CHF, No arrhythmia, No cardiomegaly, No leg swelling  GASTROINTESTINAL: No abdominal, No epigastric pain. No nausea, No vomiting, No hematemesis, No diarrhea, No constipation. No melena, No hematochezia. No GERD  GENITOURINARY: No dysuria, No frequency, No hematuria, No incontinence, No nocturia, No hesitancy,  SKIN: No itching, No burning, No rashes, No lesions   LYMPH NODES: No history of enlarged glands  ENDOCRINE: No heat or cold intolerance, No hair loss. No osteoporosis, No thyroid disease  MUSCULOSKELETAL: Hip pain tolerable  PSYCHIATRIC: No depression, No anxiety, No mood swings, No difficulty sleeping  HEME/LYMPH: No easy bruising, No anticoagulants, No bleeding disorder, No bleeding gums  ALLERGY AND IMMUNOLOGIC: No hives, No eczema  NEUROLOGICAL: No memory loss, No loss of strength, No numbness, No tremors        VITALS:   T(C): 36.5 (12-11-17 @ 13:40), Max: 36.7 (12-11-17 @ 00:10)  HR: 73 (12-11-17 @ 13:40) (73 - 81)  BP: 128/65 (12-11-17 @ 13:40) (128/65 - 155/73)  RR: 18 (12-11-17 @ 13:40) (18 - 18)  SpO2: 96% (12-11-17 @ 13:40) (92% - 96%)  Wt(kg): --    PHYSICAL EXAM:  GENERAL: NAD, well nourished and conversant  HEAD:  Atraumatic  EYES: EOM, PERRLA, conjunctiva pink and sclera white  ENT: No tonsillar erythema, exudates, or enlargement, moist mucous membranes, good dentition, no lesions  NECK: Supple, No JVD, normal thyroid, carotids with normal upstrokes and no bruits  CHEST/LUNG: Clear to auscultation bilaterally, = Rhonch B/L  HEART: Regular rate and rhythm, No murmurs, rubs, or gallops  ABDOMEN: Soft, nondistended, no masses, guarding, tenderness or rebound, bowel sounds present  EXTREMITIES:  2+ Peripheral Pulses, No clubbing, cyanosis, or edema.  (+) pain due do Hip surgery tolerable,. Wound clean naddry  LYMPH: No lymphadenopathy noted  SKIN: No rashes or lesions  NERVOUS SYSTEM:  Alert & Oriented X3, normal cognitive function. Motor Strength 5/5 right upper and right lower.  5/5 left upper and left lower extremities, DTRs 2+ intact and symmetric    LABS:        CBC Full  -  ( 11 Dec 2017 07:42 )  WBC Count : 6.88 K/uL  Hemoglobin : 10.8 g/dL  Hematocrit : 33.1 %  Platelet Count - Automated : 449 K/uL  Mean Cell Volume : 88.0 fl  Mean Cell Hemoglobin : 28.7 pg  Mean Cell Hemoglobin Concentration : 32.6 gm/dL  Auto Neutrophil # : x  Auto Lymphocyte # : x  Auto Monocyte # : x  Auto Eosinophil # : x  Auto Basophil # : x  Auto Neutrophil % : x  Auto Lymphocyte % : x  Auto Monocyte % : x  Auto Eosinophil % : x  Auto Basophil % : x    12-11    139  |  99  |  41<H>  ----------------------------<  98  4.0   |  25  |  1.39<H>    Ca    9.4      11 Dec 2017 07:52        PT/INR - ( 11 Dec 2017 07:42 )   PT: 13.4 sec;   INR: 1.18 ratio         PTT - ( 11 Dec 2017 07:42 )  PTT:30.8 sec    CAPILLARY BLOOD GLUCOSE          RADIOLOGY & ADDITIONAL TESTS:      t hip dislocations scheduled for revision of THR

## 2017-12-12 ENCOUNTER — TRANSCRIPTION ENCOUNTER (OUTPATIENT)
Age: 82
End: 2017-12-12

## 2017-12-12 LAB
ANION GAP SERPL CALC-SCNC: 14 MMOL/L — SIGNIFICANT CHANGE UP (ref 5–17)
BUN SERPL-MCNC: 36 MG/DL — HIGH (ref 7–23)
CALCIUM SERPL-MCNC: 8.6 MG/DL — SIGNIFICANT CHANGE UP (ref 8.4–10.5)
CHLORIDE SERPL-SCNC: 102 MMOL/L — SIGNIFICANT CHANGE UP (ref 96–108)
CO2 SERPL-SCNC: 24 MMOL/L — SIGNIFICANT CHANGE UP (ref 22–31)
CREAT SERPL-MCNC: 1.34 MG/DL — HIGH (ref 0.5–1.3)
GLUCOSE SERPL-MCNC: 80 MG/DL — SIGNIFICANT CHANGE UP (ref 70–99)
HCT VFR BLD CALC: 29.9 % — LOW (ref 34.5–45)
HGB BLD-MCNC: 9.7 G/DL — LOW (ref 11.5–15.5)
INR BLD: 1.43 RATIO — HIGH (ref 0.88–1.16)
MCHC RBC-ENTMCNC: 29 PG — SIGNIFICANT CHANGE UP (ref 27–34)
MCHC RBC-ENTMCNC: 32.4 GM/DL — SIGNIFICANT CHANGE UP (ref 32–36)
MCV RBC AUTO: 89.3 FL — SIGNIFICANT CHANGE UP (ref 80–100)
PLATELET # BLD AUTO: 427 K/UL — HIGH (ref 150–400)
POTASSIUM SERPL-MCNC: 4.1 MMOL/L — SIGNIFICANT CHANGE UP (ref 3.5–5.3)
POTASSIUM SERPL-SCNC: 4.1 MMOL/L — SIGNIFICANT CHANGE UP (ref 3.5–5.3)
PROTHROM AB SERPL-ACNC: 16.3 SEC — HIGH (ref 10–13.1)
RBC # BLD: 3.35 M/UL — LOW (ref 3.8–5.2)
RBC # FLD: 14.6 % — HIGH (ref 10.3–14.5)
SODIUM SERPL-SCNC: 140 MMOL/L — SIGNIFICANT CHANGE UP (ref 135–145)
WBC # BLD: 6.62 K/UL — SIGNIFICANT CHANGE UP (ref 3.8–10.5)
WBC # FLD AUTO: 6.62 K/UL — SIGNIFICANT CHANGE UP (ref 3.8–10.5)

## 2017-12-12 RX ORDER — WARFARIN SODIUM 2.5 MG/1
5 TABLET ORAL ONCE
Qty: 0 | Refills: 0 | Status: COMPLETED | OUTPATIENT
Start: 2017-12-12 | End: 2017-12-12

## 2017-12-12 RX ADMIN — Medication 25 MICROGRAM(S): at 06:32

## 2017-12-12 RX ADMIN — TRAMADOL HYDROCHLORIDE 50 MILLIGRAM(S): 50 TABLET ORAL at 06:32

## 2017-12-12 RX ADMIN — Medication 75 MILLIGRAM(S): at 09:39

## 2017-12-12 RX ADMIN — AMLODIPINE BESYLATE 5 MILLIGRAM(S): 2.5 TABLET ORAL at 06:33

## 2017-12-12 RX ADMIN — DRONEDARONE 400 MILLIGRAM(S): 400 TABLET, FILM COATED ORAL at 06:32

## 2017-12-12 RX ADMIN — Medication 0.5 MILLIGRAM(S): at 17:31

## 2017-12-12 RX ADMIN — Medication 1 SPRAY(S): at 23:34

## 2017-12-12 RX ADMIN — POLYETHYLENE GLYCOL 3350 17 GRAM(S): 17 POWDER, FOR SOLUTION ORAL at 11:17

## 2017-12-12 RX ADMIN — Medication 75 MILLIGRAM(S): at 17:25

## 2017-12-12 RX ADMIN — Medication 650 MILLIGRAM(S): at 23:31

## 2017-12-12 RX ADMIN — DRONEDARONE 400 MILLIGRAM(S): 400 TABLET, FILM COATED ORAL at 17:26

## 2017-12-12 RX ADMIN — SODIUM CHLORIDE 2000 MILLILITER(S): 9 INJECTION INTRAMUSCULAR; INTRAVENOUS; SUBCUTANEOUS at 06:36

## 2017-12-12 RX ADMIN — Medication 650 MILLIGRAM(S): at 17:22

## 2017-12-12 RX ADMIN — Medication 15 MILLIGRAM(S): at 13:35

## 2017-12-12 RX ADMIN — FAMOTIDINE 20 MILLIGRAM(S): 10 INJECTION INTRAVENOUS at 11:16

## 2017-12-12 RX ADMIN — Medication 650 MILLIGRAM(S): at 11:17

## 2017-12-12 RX ADMIN — Medication 100 MILLIGRAM(S): at 13:33

## 2017-12-12 RX ADMIN — TRAMADOL HYDROCHLORIDE 50 MILLIGRAM(S): 50 TABLET ORAL at 13:32

## 2017-12-12 RX ADMIN — Medication 1 DROP(S): at 11:17

## 2017-12-12 RX ADMIN — TRAMADOL HYDROCHLORIDE 50 MILLIGRAM(S): 50 TABLET ORAL at 21:06

## 2017-12-12 RX ADMIN — Medication 0.5 MILLIGRAM(S): at 06:32

## 2017-12-12 RX ADMIN — Medication 650 MILLIGRAM(S): at 06:32

## 2017-12-12 RX ADMIN — TIOTROPIUM BROMIDE 1 CAPSULE(S): 18 CAPSULE ORAL; RESPIRATORY (INHALATION) at 11:19

## 2017-12-12 RX ADMIN — TRAMADOL HYDROCHLORIDE 50 MILLIGRAM(S): 50 TABLET ORAL at 21:36

## 2017-12-12 RX ADMIN — Medication 15 MILLIGRAM(S): at 06:35

## 2017-12-12 RX ADMIN — Medication 100 MILLIGRAM(S): at 21:06

## 2017-12-12 RX ADMIN — WARFARIN SODIUM 5 MILLIGRAM(S): 2.5 TABLET ORAL at 21:06

## 2017-12-12 RX ADMIN — Medication 100 MILLIGRAM(S): at 06:33

## 2017-12-12 NOTE — PHYSICAL THERAPY INITIAL EVALUATION ADULT - PRECAUTIONS/LIMITATIONS, REHAB EVAL
left hip precautions/posterior precautions posterior precautions/left hip precautions/fall precautions

## 2017-12-12 NOTE — PROGRESS NOTE ADULT - SUBJECTIVE AND OBJECTIVE BOX
86 yo woman patient is a 87y old  Female who presents with a chief complaint of left hip dislocation (07 Dec 2017 22:35)  Post-op and out of bed to chair with early ambulation today.    MEDICATIONS  (STANDING):  acetaminophen   Tablet 650 milliGRAM(s) Oral every 6 hours  amLODIPine   Tablet 5 milliGRAM(s) Oral daily  artificial  tears Solution 1 Drop(s) Both EYES daily  buDESOnide   0.5 milliGRAM(s) Respule 0.5 milliGRAM(s) Inhalation two times a day  docusate sodium 100 milliGRAM(s) Oral three times a day  dronedarone 400 milliGRAM(s) Oral two times a day  famotidine    Tablet 20 milliGRAM(s) Oral every other day  furosemide    Tablet 20 milliGRAM(s) Oral every other day  levothyroxine 25 MICROGram(s) Oral daily  polyethylene glycol 3350 17 Gram(s) Oral daily  sodium chloride 0.9%. 1000 milliLiter(s) (100 mL/Hr) IV Continuous <Continuous>  tiotropium 18 MICROgram(s) Capsule 1 Capsule(s) Inhalation daily  traMADol 50 milliGRAM(s) Oral every 8 hours  venlafaxine 75 milliGRAM(s) Oral two times a day with meals  warfarin 5 milliGRAM(s) Oral once    MEDICATIONS  (PRN):  ALBUTerol    90 MICROgram(s) HFA Inhaler 2 Puff(s) Inhalation every 6 hours PRN Bronchospasm  ALPRAZolam 0.25 milliGRAM(s) Oral at bedtime PRN sleep/anxiety  aluminum hydroxide/magnesium hydroxide/simethicone Suspension 30 milliLiter(s) Oral four times a day PRN Indigestion  HYDROmorphone  Injectable 1 milliGRAM(s) IV Push every 4 hours PRN Severe Pain  magnesium hydroxide Suspension 30 milliLiter(s) Oral daily PRN Constipation  oxyCODONE    IR 5 milliGRAM(s) Oral every 4 hours PRN Mild Pain  oxyCODONE    IR 10 milliGRAM(s) Oral every 4 hours PRN Moderate Pain  senna 2 Tablet(s) Oral at bedtime PRN Constipation  sodium chloride 0.65% Nasal 1 Spray(s) Both Nostrils daily PRN Nasal Congestion        REVIEW OF SYSTEM:  CONSTITUTIONAL: No fever, No change in weight, No fatigue  HEAD: No headache, No dizziness, No recent trauma  EYES: No eye pain, No visual disturbances, No discharge  ENT:  No difficulty hearing, No tinnitus, No vertigo, No sinus pain, No throat pain  NECK: No pain, No stiffness  BREASTS: No pain, No masses, No nipple discharge  RESPIRATORY: No cough, No wheezing, No chills, No hemoptysis, No shortness of breath at rest or exertional shortness of breath  CARDIOVASCULAR: No chest pain, No palpitations, No dizziness, No CHF, No arrhythmia, No cardiomegaly, No leg swelling  GASTROINTESTINAL: No abdominal, No epigastric pain. No nausea, No vomiting, No hematemesis, No diarrhea, No constipation. No melena, No hematochezia. No GERD  GENITOURINARY: No dysuria, No frequency, No hematuria, No incontinence, No nocturia, No hesitancy,  SKIN: No itching, No burning, No rashes, No lesions   LYMPH NODES: No history of enlarged glands  ENDOCRINE: No heat or cold intolerance, No hair loss. No osteoporosis, No thyroid disease  MUSCULOSKELETAL: Left Hip pain is tolerable post-op  PSYCHIATRIC: No depression, No anxiety, No mood swings, No difficulty sleeping  HEME/LYMPH: No easy bruising, No anticoagulants, No bleeding disorder, No bleeding gums  ALLERGY AND IMMUNOLOGIC: No hives, No eczema  NEUROLOGICAL: No memory loss, No loss of strength, No numbness, No tremors        VITALS:   T(C): 36.5 (12-11-17 @ 13:40), Max: 36.7 (12-11-17 @ 00:10)  HR: 73 (12-11-17 @ 13:40) (73 - 81)  BP: 128/65 (12-11-17 @ 13:40) (128/65 - 155/73)  RR: 18 (12-11-17 @ 13:40) (18 - 18)  SpO2: 96% (12-11-17 @ 13:40) (92% - 96%)  Wt(kg): --    PHYSICAL EXAM:  GENERAL: NAD, well nourished and conversant  HEAD:  Atraumatic  EYES: EOM, PERRLA, conjunctiva pink and sclera white  ENT: No tonsillar erythema, exudates, or enlargement, moist mucous membranes, good dentition, no lesions  NECK: Supple, No JVD, normal thyroid, carotids with normal upstrokes and no bruits  CHEST/LUNG: Clear to auscultation bilaterally, = Rhonch B/L  HEART: Regular rate and rhythm, No murmurs, rubs, or gallops  ABDOMEN: Soft, nondistended, no masses, guarding, tenderness or rebound, bowel sounds present  EXTREMITIES:  2+ Peripheral Pulses, No clubbing, cyanosis, or edema.  (+) pain due do Hip surgery tolerable,. Wound clean and dry  LYMPH: No lymphadenopathy noted  SKIN: No rashes or lesions  NERVOUS SYSTEM:  Alert & Oriented X3, normal cognitive function. Motor Strength 5/5 right upper and right lower.  5/5 left upper and left lower extremities, DTRs 2+ intact and symmetric    LABS:          CBC Full  -  ( 12 Dec 2017 07:26 )  WBC Count : 6.62 K/uL  Hemoglobin : 9.7 g/dL  Hematocrit : 29.9 %  Platelet Count - Automated : 427 K/uL  Mean Cell Volume : 89.3 fl  Mean Cell Hemoglobin : 29.0 pg  Mean Cell Hemoglobin Concentration : 32.4 gm/dL  Auto Neutrophil # : x  Auto Lymphocyte # : x  Auto Monocyte # : x  Auto Eosinophil # : x  Auto Basophil # : x  Auto Neutrophil % : x  Auto Lymphocyte % : x  Auto Monocyte % : x  Auto Eosinophil % : x  Auto Basophil % : x    12-12    140  |  102  |  36<H>  ----------------------------<  80  4.1   |  24  |  1.34<H>    Ca    8.6      12 Dec 2017 07:36        PT/INR - ( 12 Dec 2017 07:26 )   PT: 16.3 sec;   INR: 1.43 ratio         PTT - ( 11 Dec 2017 18:30 )  PTT:34.2 sec  	                RADIOLOGY & ADDITIONAL TESTS:      t hip dislocations scheduled for revision of THR

## 2017-12-12 NOTE — OCCUPATIONAL THERAPY INITIAL EVALUATION ADULT - PERTINENT HX OF CURRENT PROBLEM, REHAB EVAL
87y Female pmh copd, afib, lymphoma , community ambulator with assistance with walker p/w L total hip arthroplasty dislocation, denies trauma. L ROB in 2015 by Patrick nolan polyexchange and I+D, c/b 2 dislocations and revision L ROB. The patient has had a R ROB and revision done by Dr. Baer about 15 and 10 years ago respectively. s/p  Minor revision of total hip arthroplasty  12/11/2017  exchange of left constrained liner

## 2017-12-12 NOTE — DISCHARGE NOTE ADULT - CARE PLAN
Principal Discharge DX:	Hip dislocation, left, initial encounter  Goal:	Painless ambulation Principal Discharge DX:	Hip dislocation, left, initial encounter  Goal:	Painless ambulation  Instructions for follow-up, activity and diet:	WBAT:  posterior hip precautions  Keep Aquacel dressing clean and dry

## 2017-12-12 NOTE — DISCHARGE NOTE ADULT - CARE PROVIDERS DIRECT ADDRESSES
[email or web address]

## 2017-12-12 NOTE — PHYSICAL THERAPY INITIAL EVALUATION ADULT - PERTINENT HX OF CURRENT PROBLEM, REHAB EVAL
86 yo F p/w L total hip arthroplasty dislocation, denies trauma. L ROB in 2015 by Patrick nolan polyexchange and I+D, c/b 2 dislocations and revision L ROB. The patient has had a R ROB and revision done by Dr. Baer about 15 and 10 years ago respectively. S/p LTHA revision, exchange of constrained liner 12/11.

## 2017-12-12 NOTE — DISCHARGE NOTE ADULT - HOSPITAL COURSE
Admit: 12/8/17  Diagnosis: Dislocation of Left Total Hip Arthroplasty  Procedure: Liner Exchange of Left Total Hip Arthroplasty  Surgeon: Dr. Baeza       History of Present Illness:  Chief Complaint/Reason for Admission: left hip dislocation	  History of Present Illness: 	  87y Female pmh copd, afib (coumadin), lymphoma (sp radiation), community ambulator with assistance with walker presenting with L total hip arthroplasty dislocation, denies trauma. L ROB in 2015 by Patrick Muir sp polyexchange and I+D, c/b 2 dislocations and revision L ROB. The patient has had a R ROB and revision done by Dr. Baer about 15 and 10 years ago respectively. Denies HS/LOC. Denies numbness/tingling. Denies fever/chills. Denies pain/injury elsewhere.     PAST MEDICAL & SURGICAL HISTORY:  H/O: Osteoarthritis  Anxiety  History of Hypothyroidism  High Cholesterol  HTN (Hypertension)  S/P Carpal Tunnel Release  Hip Replacement  Knee Replacement    MEDICATIONS  (STANDING):    Allergies    No Known Drug Allergies  shellfish (Diarrhea)    Intolerances         Review of Systems:  Other Review of Systems: All other review of systems negative, except as noted in HPI	      Allergies and Intolerances:        Allergies:  	shellfish: Food, Diarrhea  	No Known Drug Allergies:     Home Medications:   * Outpatient Medication Status not yet specified      Patient History:    Past Medical History:  A-fib    Anxiety    COPD (chronic obstructive pulmonary disease)    H/O: Osteoarthritis    High Cholesterol    History of Hypothyroidism    HTN (Hypertension).     Past Surgical History:  Hip Replacement    Knee Replacement    S/P Carpal Tunnel Release.    Hospital course: Admit: 12/8/17  Diagnosis: Dislocation of Left Total Hip Arthroplasty  Procedure: Liner Exchange of Left Total Hip Arthroplasty  Surgeon: Dr. Baeza       History of Present Illness:  Chief Complaint/Reason for Admission: left hip dislocation	  History of Present Illness: 	  87y Female pmh copd, afib (coumadin), lymphoma (sp radiation), community ambulator with assistance with walker presenting with L total hip arthroplasty dislocation, denies trauma. L ROB in 2015 by Patrick nolan polyexchange and I+D, c/b 2 dislocations and revision L ROB. The patient has had a R ROB and revision done by Dr. Baer about 15 and 10 years ago respectively. Denies HS/LOC. Denies numbness/tingling. Denies fever/chills. Denies pain/injury elsewhere.     PAST MEDICAL & SURGICAL HISTORY:  H/O: Osteoarthritis  Anxiety  History of Hypothyroidism  High Cholesterol  HTN (Hypertension)  S/P Carpal Tunnel Release  Hip Replacement  Knee Replacement    MEDICATIONS  (STANDING):    Allergies    No Known Drug Allergies  shellfish (Diarrhea)    Intolerances         Review of Systems:  Other Review of Systems: All other review of systems negative, except as noted in HPI	      Allergies and Intolerances:        Allergies:  	shellfish: Food, Diarrhea  	No Known Drug Allergies:     Home Medications:   * Outpatient Medication Status not yet specified      Patient History:    Past Medical History:  A-fib    Anxiety    COPD (chronic obstructive pulmonary disease)    H/O: Osteoarthritis    High Cholesterol    History of Hypothyroidism    HTN (Hypertension).     Past Surgical History:  Hip Replacement    Knee Replacement    S/P Carpal Tunnel Release.    Hospital course:  12/7;  Pt seen by medicine prior to OR  12/8: Pt sent to IR for Asp.  All Cx are (-) to date  12/11: Pt underwent revision L ROB w/ liner exchange) OR Cx were also sent which have been (-) to date  12/12: Eval by PT:  WBAT posterior precautions  12/13: Pt stable for d/c to rehab    Follow up Dr Felisha fuller rehab

## 2017-12-12 NOTE — PROGRESS NOTE ADULT - SUBJECTIVE AND OBJECTIVE BOX
Patient seen and examined. Pain controlled. No acute events overnight. Patient will work with PT.    Allergies    No Known Drug Allergies  shellfish (Diarrhea)    Intolerances                            11.0   7.6   )-----------( 458      ( 11 Dec 2017 18:30 )             32.9     11 Dec 2017 18:30    140    |  102    |  35     ----------------------------<  79     4.1     |  22     |  1.33     Ca    8.8        11 Dec 2017 18:30      PT/INR - ( 11 Dec 2017 18:30 )   PT: 14.0 sec;   INR: 1.29 ratio         PTT - ( 11 Dec 2017 18:30 )  PTT:34.2 sec  Vital Signs Last 24 Hrs  T(C): 36.7 (12-12-17 @ 05:06), Max: 36.8 (12-12-17 @ 00:20)  T(F): 98.1 (12-12-17 @ 05:06), Max: 98.2 (12-12-17 @ 00:20)  HR: 66 (12-12-17 @ 05:06) (62 - 80)  BP: 113/73 (12-12-17 @ 05:06) (109/53 - 151/75)  BP(mean): 94 (12-11-17 @ 18:45) (77 - 97)  RR: 18 (12-12-17 @ 05:06) (14 - 18)  SpO2: 99% (12-12-17 @ 05:06) (95% - 100%)    Physical Exam  Gen: NAD  LLE:   Dressing c/d/i  +ehl/fhl/ta/gs function  L2-S1 silt  Dp/pt pulse intact  No calf ttp  Compartments soft    A/P:  87y Female sp L revision ROB exchange of constrained liner  Pain control  DVT ppx, coumadin  PT/WBAT/Posterior hip precautions/OOB  FU labs  Medical management appreciated  Incentive spirometry  Dispo planning

## 2017-12-12 NOTE — DISCHARGE NOTE ADULT - MEDICATION SUMMARY - MEDICATIONS TO CHANGE
I will SWITCH the dose or number of times a day I take the medications listed below when I get home from the hospital:    Tylenol 500 mg oral tablet  -- 2 tab(s) by mouth every 6 hours

## 2017-12-12 NOTE — DISCHARGE NOTE ADULT - MEDICATION SUMMARY - MEDICATIONS TO TAKE
I will START or STAY ON the medications listed below when I get home from the hospital:    budesonide 0.5 mg/2 mL inhalation suspension  -- 2 milliliter(s) inhaled 2 times a day  -- Indication: For Pulm agent    acetaminophen 325 mg oral tablet  -- 2 tab(s) by mouth every 6 hours, As Needed (fever or pain)  -- Indication: For PAin / fever    oxyCODONE 5 mg oral tablet  -- 1 tab(s) by mouth every 6 hours, As Needed  -- Indication: For PAin    traMADol 50 mg oral tablet  -- 1 tab(s) by mouth every 8 hours, As Needed  (moderate pain)  -- Indication: For PAin    Multaq 400 mg oral tablet  -- 1 tab(s) by mouth once a day  -- Indication: For Cardiac med    warfarin 4 mg oral tablet  -- 1 tab(s) by mouth once a day (at bedtime)  KEEP INR 2-3  -- Indication: For A-fib    venlafaxine 75 mg oral tablet  -- 1 tab(s) by mouth 2 times a day  -- Indication: For Anxiety    Xanax 0.25 mg oral tablet  -- 1 tab(s) by mouth once a day (at bedtime)  -- Indication: For Anxiety    albuterol 0.63 mg/3 mL (0.021%) inhalation solution  -- 3 milliliter(s) inhaled 3 times a day, As Needed  -- Indication: For Pulm agent    Spiriva 18 mcg inhalation capsule  -- 1 cap(s) inhaled once a day  -- Indication: For Pulm agent    Norvasc 5 mg oral tablet  -- 1 tab(s) by mouth once a day  -- Indication: For HTN (Hypertension)    Lasix 20 mg oral tablet  -- 1 tab(s) by mouth every other day  -- Indication: For HTN (Hypertension)    Zantac 150 oral tablet  -- 1 tab(s) by mouth 2 times a day  -- Indication: For GERD    docusate sodium 100 mg oral capsule  -- 1 cap(s) by mouth 3 times a day  -- Indication: For stool softener    polyethylene glycol 3350 oral powder for reconstitution  -- 17 gram(s) by mouth once a day  -- Indication: For laxative    senna oral tablet  -- 2 tab(s) by mouth once a day (at bedtime), As needed, Constipation  -- Indication: For laxative    Artificial Tears ophthalmic solution  -- 1 drop(s) to each affected eye 2 times a day  -- Indication: For ocular agent    Synthroid 25 mcg (0.025 mg) oral tablet  -- 1 tab(s) by mouth once a day  -- Indication: For thyroid    Multiple Vitamins oral tablet  -- 1 tab(s) by mouth once a day  -- Indication: For supplement

## 2017-12-12 NOTE — DISCHARGE NOTE ADULT - PATIENT PORTAL LINK FT
“You can access the [employer] Patient Portal, offered by [facility], by registering with the following website: [email or web address]”

## 2017-12-12 NOTE — DISCHARGE NOTE ADULT - OTHER SIGNIFICANT FINDINGS
h/h; 9.7/28.9      INR (12/13);  2.05    Asp Cx; NGTD (12/13)    OR Cx:  NGTD (12/13)      EXAM:  CT PELVIS BONY ONLY WAW IC                          PROCEDURE DATE:  12/08/2017      IMPRESSION:  1.  Large left hip joint effusion with peripheral enhancement. It is not   possible to determine if this collection is sterile or infected on the   basis of imaging alone. Consider percutaneous aspiration for further   evaluation.  2.  Patient status post bilateral total hip arthroplasties without   evidence of hardware fracture, periprosthetic fracture, or loosening.

## 2017-12-12 NOTE — PHYSICAL THERAPY INITIAL EVALUATION ADULT - ADDITIONAL COMMENTS
Lives in a skilled nursing facility. Community ambulator with assistance with walker. Lives in a skilled nursing facility where she received assistance with bathing. Per pt she ambulated independently with a rolling walker.

## 2017-12-12 NOTE — OCCUPATIONAL THERAPY INITIAL EVALUATION ADULT - LIVES WITH, PROFILE
lives in long term care independent with most ADLs, ambulates with a RW, pt requires assistance to shower

## 2017-12-12 NOTE — DISCHARGE NOTE ADULT - NS AS ACTIVITY OBS
Do not make important decisions/Do not drive or operate machinery/Showering allowed/Walking-Indoors allowed/Stairs allowed/Walking-Outdoors allowed/No Heavy lifting/straining

## 2017-12-12 NOTE — DISCHARGE NOTE ADULT - ADDITIONAL INSTRUCTIONS
Take medications as prescribed. Continue Coumadin for clot prevention. It is highly recommended you follow up with your Primary Care Physician within a month of discharge to discuss recent surgery and medications. Call to make appointment. Take medications as prescribed.   Continue Coumadin for clot prevention. It is highly recommended you follow up with your Primary Care Physician within a month of discharge to discuss recent surgery and medications.   Call to make appointment. Take medications as prescribed.   Continue Coumadin for clot prevention and A-fib 9 keep IN 2-3)  POD #10-14: (12/22-12/24);  d/c dressing; d/c Aquacel; d/c staples -> steri-strip (if applicable)  Follow up Dr Baeza upon d/c from rehab  . It is highly recommended you follow up with your Primary Care Physician within a month of discharge to discuss recent surgery and medications.  Please call for an appointment    Call to make appointment.

## 2017-12-13 VITALS
SYSTOLIC BLOOD PRESSURE: 109 MMHG | TEMPERATURE: 98 F | DIASTOLIC BLOOD PRESSURE: 54 MMHG | OXYGEN SATURATION: 98 % | RESPIRATION RATE: 16 BRPM | HEART RATE: 70 BPM

## 2017-12-13 LAB
APTT BLD: 34 SEC — SIGNIFICANT CHANGE UP (ref 27.5–37.4)
CULTURE RESULTS: SIGNIFICANT CHANGE UP
INR BLD: 2.05 RATIO — HIGH (ref 0.88–1.16)
PROTHROM AB SERPL-ACNC: 23.5 SEC — HIGH (ref 10–13.1)
SPECIMEN SOURCE: SIGNIFICANT CHANGE UP

## 2017-12-13 RX ORDER — ACETAMINOPHEN 500 MG
2 TABLET ORAL
Qty: 0 | Refills: 0 | COMMUNITY
Start: 2017-12-13

## 2017-12-13 RX ORDER — ACETAMINOPHEN 500 MG
2 TABLET ORAL
Qty: 0 | Refills: 0 | COMMUNITY

## 2017-12-13 RX ORDER — POLYETHYLENE GLYCOL 3350 17 G/17G
17 POWDER, FOR SOLUTION ORAL
Qty: 0 | Refills: 0 | COMMUNITY
Start: 2017-12-13

## 2017-12-13 RX ORDER — TRAMADOL HYDROCHLORIDE 50 MG/1
1 TABLET ORAL
Qty: 0 | Refills: 0 | COMMUNITY
Start: 2017-12-13

## 2017-12-13 RX ORDER — WARFARIN SODIUM 2.5 MG/1
2 TABLET ORAL ONCE
Qty: 0 | Refills: 0 | Status: DISCONTINUED | OUTPATIENT
Start: 2017-12-13 | End: 2017-12-13

## 2017-12-13 RX ORDER — WARFARIN SODIUM 2.5 MG/1
1 TABLET ORAL
Qty: 0 | Refills: 0 | COMMUNITY

## 2017-12-13 RX ORDER — PROCHLORPERAZINE MALEATE 5 MG
10 TABLET ORAL ONCE
Qty: 0 | Refills: 0 | Status: COMPLETED | OUTPATIENT
Start: 2017-12-13 | End: 2017-12-13

## 2017-12-13 RX ORDER — SODIUM CHLORIDE 0.65 %
2 AEROSOL, SPRAY (ML) NASAL
Qty: 0 | Refills: 0 | COMMUNITY

## 2017-12-13 RX ORDER — OXYCODONE HYDROCHLORIDE 5 MG/1
1 TABLET ORAL
Qty: 0 | Refills: 0 | COMMUNITY
Start: 2017-12-13

## 2017-12-13 RX ORDER — DOCUSATE SODIUM 100 MG
1 CAPSULE ORAL
Qty: 0 | Refills: 0 | COMMUNITY
Start: 2017-12-13

## 2017-12-13 RX ORDER — SENNA PLUS 8.6 MG/1
2 TABLET ORAL
Qty: 0 | Refills: 0 | COMMUNITY
Start: 2017-12-13

## 2017-12-13 RX ADMIN — Medication 100 MILLIGRAM(S): at 05:23

## 2017-12-13 RX ADMIN — Medication 25 MICROGRAM(S): at 05:23

## 2017-12-13 RX ADMIN — TRAMADOL HYDROCHLORIDE 50 MILLIGRAM(S): 50 TABLET ORAL at 05:27

## 2017-12-13 RX ADMIN — TRAMADOL HYDROCHLORIDE 50 MILLIGRAM(S): 50 TABLET ORAL at 13:35

## 2017-12-13 RX ADMIN — Medication 650 MILLIGRAM(S): at 05:23

## 2017-12-13 RX ADMIN — Medication 1 DROP(S): at 12:22

## 2017-12-13 RX ADMIN — AMLODIPINE BESYLATE 5 MILLIGRAM(S): 2.5 TABLET ORAL at 05:23

## 2017-12-13 RX ADMIN — Medication 1 SPRAY(S): at 07:46

## 2017-12-13 RX ADMIN — TIOTROPIUM BROMIDE 1 CAPSULE(S): 18 CAPSULE ORAL; RESPIRATORY (INHALATION) at 12:22

## 2017-12-13 RX ADMIN — Medication 10 MILLIGRAM(S): at 12:22

## 2017-12-13 RX ADMIN — TRAMADOL HYDROCHLORIDE 50 MILLIGRAM(S): 50 TABLET ORAL at 06:00

## 2017-12-13 RX ADMIN — Medication 75 MILLIGRAM(S): at 07:46

## 2017-12-13 RX ADMIN — TRAMADOL HYDROCHLORIDE 50 MILLIGRAM(S): 50 TABLET ORAL at 13:07

## 2017-12-13 RX ADMIN — DRONEDARONE 400 MILLIGRAM(S): 400 TABLET, FILM COATED ORAL at 05:23

## 2017-12-13 RX ADMIN — Medication 0.5 MILLIGRAM(S): at 05:24

## 2017-12-13 RX ADMIN — Medication 650 MILLIGRAM(S): at 12:22

## 2017-12-13 NOTE — PROGRESS NOTE ADULT - PROBLEM SELECTOR PROBLEM 4
HTN (Hypertension)

## 2017-12-13 NOTE — PROGRESS NOTE ADULT - SUBJECTIVE AND OBJECTIVE BOX
Patient is a 87y old  Female who presents with a chief complaint of left hip dislocation  Revision L ROB (12 Dec 2017 07:28)      HPI: Patient alert conversant no SOB chest pian    Possible  discharge soon No fever chills  sob or chest pain Incentive spirometry stressed                       MEDICATIONS  (STANDING):  acetaminophen   Tablet 650 milliGRAM(s) Oral every 6 hours  amLODIPine   Tablet 5 milliGRAM(s) Oral daily  artificial  tears Solution 1 Drop(s) Both EYES daily  buDESOnide   0.5 milliGRAM(s) Respule 0.5 milliGRAM(s) Inhalation two times a day  docusate sodium 100 milliGRAM(s) Oral three times a day  dronedarone 400 milliGRAM(s) Oral two times a day  famotidine    Tablet 20 milliGRAM(s) Oral every other day  furosemide    Tablet 20 milliGRAM(s) Oral every other day  levothyroxine 25 MICROGram(s) Oral daily  multivitamin 1 Tablet(s) Oral daily  polyethylene glycol 3350 17 Gram(s) Oral daily  sodium chloride 0.9%. 1000 milliLiter(s) (100 mL/Hr) IV Continuous <Continuous>  tiotropium 18 MICROgram(s) Capsule 1 Capsule(s) Inhalation daily  traMADol 50 milliGRAM(s) Oral every 8 hours  venlafaxine 75 milliGRAM(s) Oral two times a day with meals  warfarin 2 milliGRAM(s) Oral once    MEDICATIONS  (PRN):  ALBUTerol    90 MICROgram(s) HFA Inhaler 2 Puff(s) Inhalation every 6 hours PRN Bronchospasm  ALPRAZolam 0.25 milliGRAM(s) Oral at bedtime PRN sleep/anxiety  aluminum hydroxide/magnesium hydroxide/simethicone Suspension 30 milliLiter(s) Oral four times a day PRN Indigestion  bisacodyl Suppository 10 milliGRAM(s) Rectal daily PRN If no bowel movement by POD#2  magnesium hydroxide Suspension 30 milliLiter(s) Oral daily PRN Constipation  oxyCODONE    IR 5 milliGRAM(s) Oral every 4 hours PRN Mild Pain  oxyCODONE    IR 10 milliGRAM(s) Oral every 4 hours PRN Moderate Pain  senna 2 Tablet(s) Oral at bedtime PRN Constipation  sodium chloride 0.65% Nasal 1 Spray(s) Both Nostrils daily PRN Nasal Congestion      Allergies    No Known Drug Allergies  shellfish (Diarrhea)    Intolerances        REVIEW OF SYSTEM:  CONSTITUTIONAL: No fever, No change in weight, No fatigue  HEAD: No headache, No dizziness, No recent trauma  EYES: No eye pain, No visual disturbances, No discharge  ENT:  No difficulty hearing, No tinnitus, No vertigo, No sinus pain, No throat pain  NECK: No pain, No stiffness  BREASTS: No pain, No masses, No nipple discharge  RESPIRATORY: No cough, No wheezing, No chills, No hemoptysis, No shortness of breath at rest or exertional shortness of breath  CARDIOVASCULAR: No chest pain, No palpitations, No dizziness, No CHF, No arrhythmia, No cardiomegaly, No leg swelling  GASTROINTESTINAL: No abdominal, No epigastric pain. No nausea, No vomiting, No hematemesis, No diarrhea, No constipation. No melena, No hematochezia. No GERD  GENITOURINARY: No dysuria, No frequency, No hematuria, No incontinence, No nocturia, No hesitancy,  SKIN: No itching, No burning, No rashes, No lesions   LYMPH NODES: No history of enlarged glands  ENDOCRINE: No heat or cold intolerance, No hair loss. No osteoporosis, No thyroid disease  MUSCULOSKELETAL: No joint pain or swelling, No muscle, back, or extremity pain  PSYCHIATRIC: No depression, No anxiety, No mood swings, No difficulty sleeping  HEME/LYMPH: No easy bruising, No anticoagulants, No bleeding disorder, No bleeding gums  ALLERGY AND IMMUNOLOGIC: No hives, No eczema  NEUROLOGICAL: No memory loss, No loss of strength, No numbness, No tremors        VITALS:   T(C): 36.7 (12-13-17 @ 08:22), Max: 36.7 (12-13-17 @ 08:22)  HR: 69 (12-13-17 @ 08:22) (66 - 75)  BP: 130/62 (12-13-17 @ 08:22) (113/62 - 131/65)  RR: 16 (12-13-17 @ 08:22) (16 - 17)  SpO2: 98% (12-13-17 @ 08:22) (94% - 98%)  Wt(kg): --    12-12 @ 07:01  -  12-13 @ 07:00  --------------------------------------------------------  IN: 1440 mL / OUT: 1070 mL / NET: 370 mL    12-13 @ 07:01  -  12-13 @ 13:17  --------------------------------------------------------  IN: 360 mL / OUT: 125 mL / NET: 235 mL        PHYSICAL EXAM:  GENERAL: NAD, well nourished and conversant  HEAD:  Atraumatic  EYES: EOM, PERRLA, conjunctiva pink and sclera white  ENT: No tonsillar erythema, exudates, or enlargement, moist mucous membranes, good dentition, no lesions  NECK: Supple, No JVD, normal thyroid, carotids with normal upstrokes and no bruits  CHEST/LUNG: Clear to auscultation bilaterally, No rales, rhonchi, wheezing, or rubs  HEART: Regular rate and rhythm, No murmurs, rubs, or gallops  ABDOMEN: Soft, nondistended, no masses, guarding, tenderness or rebound, bowel sounds present  EXTREMITIES:  2+ Peripheral Pulses, No clubbing, cyanosis, or edema. No arthritis of shoulders, elbows, hands, hips, knees, ankles, or feet. No DJD C spine, T spine, or L/S spine  LYMPH: No lymphadenopathy noted  SKIN: No rashes or lesions  NERVOUS SYSTEM:  Alert & Oriented X3, normal cognitive function. Motor Strength 5/5 right upper and right lower.  5/5 left upper and left lower extremities, DTRs 2+ intact and symmetric    LABS:                          9.7    6.62  )-----------( 427      ( 12 Dec 2017 07:26 )             29.9     12-12    140  |  102  |  36<H>  ----------------------------<  80  4.1   |  24  |  1.34<H>  12-11    140  |  102  |  35<H>  ----------------------------<  79  4.1   |  22  |  1.33<H>  12-11    139  |  99  |  41<H>  ----------------------------<  98  4.0   |  25  |  1.39<H>    Ca    8.6      12 Dec 2017 07:36  Ca    8.8      11 Dec 2017 18:30  Ca    9.4      11 Dec 2017 07:52      CAPILLARY BLOOD GLUCOSE          RADIOLOGY & ADDITIONAL TESTS:      Consultant(s):    Care Discussed with Consultants/Other Providers [ ] YES  [ ] NO

## 2017-12-13 NOTE — PROGRESS NOTE ADULT - PROBLEM SELECTOR PROBLEM 1
Hip dislocation, left, initial encounter

## 2017-12-13 NOTE — PROGRESS NOTE ADULT - PROBLEM SELECTOR PLAN 1
Patient with Repeat hip dislocation S/P ROB   No complica
Patient with Repeat hip dislocation S/P ROB   No complica
Patient with Repeat hip dislocation to get repeat ROB in am
Patient with Repeat hip dislocation to get repeat ROB today  no contraindication to schedule procedure
Tim with Re consider in the
Tim with Re consider in the

## 2017-12-13 NOTE — PROGRESS NOTE ADULT - PROBLEM SELECTOR PROBLEM 3
PAF (paroxysmal atrial fibrillation)

## 2017-12-13 NOTE — PROGRESS NOTE ADULT - PROBLEM SELECTOR PLAN 4
Blood pressure under control
No medical complications post-op to date and to proceed with physical therapy, as tolerated. Continues pulmonary toilet to lessen atelectasis, leg exercises as taught to lessen the risk of DVT and supervised pain medications for post-op pain control.
No medical complications post-op to date and to proceed with physical therapy, as tolerated. Continues pulmonary toilet to lessen atelectasis, leg exercises as taught to lessen the risk of DVT and supervised pain medications for post-op pain control.
Blood pressure under control

## 2017-12-13 NOTE — PROGRESS NOTE ADULT - ATTENDING COMMENTS
No medical complications post-op to date and to proceed with physical therapy, as tolerated. Continues pulmonary toilet to lessen atelectasis, leg exercises as taught to lessen the risk of DVT and supervised pain medications for post-op pain control. Discharge planning .

## 2017-12-13 NOTE — PROGRESS NOTE ADULT - ASSESSMENT
86 y/o fm with left total hip arthroplasty dislocation for revision 12/11/17, f/u INR, pre-op  Aminta Mayberry PA-C  Orthopaedic Surgery  Team pager [phone]  [facility] [phone]  [phone]
86 yo woman s/p repeated dislocation of left leg present for revision of a left THR
87F with left ROB dislocation s/p closed reduction    1. Pain control  2. NWB LLE in abduction pillow and knee immobilizer  3. NPO  4. IVF  5. FU AM labs  6. Hold anticoagulation  7. Discuss operative plan with attending today
88 yo woman s/p repeated dislocation of left leg present for revision of a left THR
88 yo woman s/p repeated dislocation of left leg present for revision of a left THR
88 yo woman s/p repeated dislocation of left leg present for revision of a left THR perormed and now out of bed w
88 yo woman s/p repeated dislocation of left leg present for revision of a left THR perormed and now out of bed w
Pt for OR in AM - Rev L ROB  -NPO p MN  -Hold Chemical DVT Ppx p MN    SANTOS Ortega PA-C  [id number]
88 yo woman s/p repeated dislocation of left leg present for revision of a left THR

## 2017-12-13 NOTE — PROGRESS NOTE ADULT - PROBLEM SELECTOR PLAN 3
Rate stable
Rate stable  Patient on coumadin INR down to 1.4  Continue to  hold coumadin for surgery in AM.
Rate stable

## 2017-12-13 NOTE — PROGRESS NOTE ADULT - SUBJECTIVE AND OBJECTIVE BOX
No acute events overnight. Pain controlled.     PE:  Vital Signs Last 24 Hrs  T(C): 36.5 (13 Dec 2017 04:28), Max: 36.6 (12 Dec 2017 20:56)  T(F): 97.7 (13 Dec 2017 04:28), Max: 97.8 (12 Dec 2017 20:56)  HR: 66 (13 Dec 2017 04:28) (64 - 75)  BP: 114/65 (13 Dec 2017 04:28) (111/62 - 131/65)  RR: 17 (13 Dec 2017 04:28) (17 - 17)  SpO2: 98% (13 Dec 2017 04:28) (93% - 98%)    Gen: No acute distress  LLE: dressing c/d/i  BLLE Motor HF/KE/TA/GCS/EHL 5/5; SILT; [facility]     Labs:                        9.7    6.62  )-----------( 427      ( 12 Dec 2017 07:26 )             29.9   12-12    140  |  102  |  36<H>  ----------------------------<  80  4.1   |  24  |  1.34<H>    Ca    8.6      12 Dec 2017 07:36    Assessment/Plan:  87yFemale s/p revision L ROB POD 2  -pain control  -PT  -WBAT  -OOB  -DVT ppx  -dispo plan

## 2017-12-16 LAB
CULTURE RESULTS: SIGNIFICANT CHANGE UP
CULTURE RESULTS: SIGNIFICANT CHANGE UP
SPECIMEN SOURCE: SIGNIFICANT CHANGE UP
SPECIMEN SOURCE: SIGNIFICANT CHANGE UP

## 2017-12-19 PROCEDURE — 20611 DRAIN/INJ JOINT/BURSA W/US: CPT

## 2017-12-19 PROCEDURE — 73502 X-RAY EXAM HIP UNI 2-3 VIEWS: CPT

## 2017-12-19 PROCEDURE — 86140 C-REACTIVE PROTEIN: CPT

## 2017-12-19 PROCEDURE — 99152 MOD SED SAME PHYS/QHP 5/>YRS: CPT

## 2017-12-19 PROCEDURE — 89060 EXAM SYNOVIAL FLUID CRYSTALS: CPT

## 2017-12-19 PROCEDURE — 94640 AIRWAY INHALATION TREATMENT: CPT

## 2017-12-19 PROCEDURE — 72194 CT PELVIS W/O & W/DYE: CPT

## 2017-12-19 PROCEDURE — 97165 OT EVAL LOW COMPLEX 30 MIN: CPT

## 2017-12-19 PROCEDURE — 87102 FUNGUS ISOLATION CULTURE: CPT

## 2017-12-19 PROCEDURE — 87086 URINE CULTURE/COLONY COUNT: CPT

## 2017-12-19 PROCEDURE — 85652 RBC SED RATE AUTOMATED: CPT

## 2017-12-19 PROCEDURE — 85027 COMPLETE CBC AUTOMATED: CPT

## 2017-12-19 PROCEDURE — 99285 EMERGENCY DEPT VISIT HI MDM: CPT | Mod: 25

## 2017-12-19 PROCEDURE — 89051 BODY FLUID CELL COUNT: CPT

## 2017-12-19 PROCEDURE — 97161 PT EVAL LOW COMPLEX 20 MIN: CPT

## 2017-12-19 PROCEDURE — 80048 BASIC METABOLIC PNL TOTAL CA: CPT

## 2017-12-19 PROCEDURE — 80053 COMPREHEN METABOLIC PANEL: CPT

## 2017-12-19 PROCEDURE — 86901 BLOOD TYPING SEROLOGIC RH(D): CPT

## 2017-12-19 PROCEDURE — 86900 BLOOD TYPING SEROLOGIC ABO: CPT

## 2017-12-19 PROCEDURE — 87205 SMEAR GRAM STAIN: CPT

## 2017-12-19 PROCEDURE — 87040 BLOOD CULTURE FOR BACTERIA: CPT

## 2017-12-19 PROCEDURE — 87070 CULTURE OTHR SPECIMN AEROBIC: CPT

## 2017-12-19 PROCEDURE — 71045 X-RAY EXAM CHEST 1 VIEW: CPT

## 2017-12-19 PROCEDURE — 85610 PROTHROMBIN TIME: CPT

## 2017-12-19 PROCEDURE — C1776: CPT

## 2017-12-19 PROCEDURE — 85730 THROMBOPLASTIN TIME PARTIAL: CPT

## 2017-12-19 PROCEDURE — 93005 ELECTROCARDIOGRAM TRACING: CPT

## 2017-12-19 PROCEDURE — 86850 RBC ANTIBODY SCREEN: CPT

## 2017-12-19 PROCEDURE — 87075 CULTR BACTERIA EXCEPT BLOOD: CPT

## 2017-12-19 PROCEDURE — 72170 X-RAY EXAM OF PELVIS: CPT

## 2017-12-19 PROCEDURE — 97116 GAIT TRAINING THERAPY: CPT

## 2017-12-19 PROCEDURE — 81003 URINALYSIS AUTO W/O SCOPE: CPT

## 2017-12-29 ENCOUNTER — APPOINTMENT (OUTPATIENT)
Dept: ORTHOPEDIC SURGERY | Facility: CLINIC | Age: 82
End: 2017-12-29
Payer: MEDICARE

## 2017-12-29 VITALS — BODY MASS INDEX: 20.56 KG/M2 | WEIGHT: 102 LBS | HEIGHT: 59 IN

## 2017-12-29 PROCEDURE — 73562 X-RAY EXAM OF KNEE 3: CPT | Mod: LT

## 2017-12-29 PROCEDURE — 99024 POSTOP FOLLOW-UP VISIT: CPT

## 2017-12-29 PROCEDURE — 73502 X-RAY EXAM HIP UNI 2-3 VIEWS: CPT | Mod: LT

## 2018-01-13 NOTE — ED PROVIDER NOTE - NEUROLOGICAL SENSATION
PT THINKS SHE HAS A YEAST INFECTION. IS TAKING RX BUT IS NOT HELPING.  HAS BEEN HAVING SYMPTOMS FOR A WEEK AND A HALF
Pt rt call, requesting to be seen today. pls adv.
Spoke with pt who states she took 2 doses of Diflucan for yeast infection. Pt states she is no longer having itching or vaginal discharge. Pt states she is now having discomfort during SI. Pt was scheduled for appt on 1/15 for evaluation.  Pt agreed and voi
present and normal in 4 extremities

## 2018-03-23 ENCOUNTER — APPOINTMENT (OUTPATIENT)
Dept: ORTHOPEDIC SURGERY | Facility: CLINIC | Age: 83
End: 2018-03-23
Payer: MEDICARE

## 2018-03-23 VITALS — WEIGHT: 105 LBS | HEIGHT: 59 IN | BODY MASS INDEX: 21.17 KG/M2

## 2018-03-23 DIAGNOSIS — Z96.649 PRESENCE OF UNSPECIFIED ARTIFICIAL HIP JOINT: ICD-10-CM

## 2018-03-23 DIAGNOSIS — G89.29 PAIN IN RIGHT HIP: ICD-10-CM

## 2018-03-23 DIAGNOSIS — M25.551 PAIN IN RIGHT HIP: ICD-10-CM

## 2018-03-23 PROCEDURE — 73521 X-RAY EXAM HIPS BI 2 VIEWS: CPT

## 2018-03-23 PROCEDURE — 99214 OFFICE O/P EST MOD 30 MIN: CPT

## 2018-03-28 PROBLEM — M25.551 CHRONIC RIGHT HIP PAIN: Status: ACTIVE | Noted: 2018-03-28

## 2018-04-13 ENCOUNTER — EMERGENCY (EMERGENCY)
Facility: HOSPITAL | Age: 83
LOS: 1 days | End: 2018-04-13
Attending: EMERGENCY MEDICINE
Payer: MEDICARE

## 2018-04-13 VITALS
OXYGEN SATURATION: 100 % | RESPIRATION RATE: 27 BRPM | SYSTOLIC BLOOD PRESSURE: 123 MMHG | HEART RATE: 107 BPM | TEMPERATURE: 99 F | DIASTOLIC BLOOD PRESSURE: 79 MMHG

## 2018-04-13 VITALS
SYSTOLIC BLOOD PRESSURE: 99 MMHG | OXYGEN SATURATION: 97 % | HEART RATE: 40 BPM | DIASTOLIC BLOOD PRESSURE: 58 MMHG | RESPIRATION RATE: 40 BRPM

## 2018-04-13 DIAGNOSIS — I50.9 HEART FAILURE, UNSPECIFIED: ICD-10-CM

## 2018-04-13 DIAGNOSIS — Z71.89 OTHER SPECIFIED COUNSELING: ICD-10-CM

## 2018-04-13 DIAGNOSIS — D68.9 COAGULATION DEFECT, UNSPECIFIED: ICD-10-CM

## 2018-04-13 LAB
ALBUMIN SERPL ELPH-MCNC: 3 G/DL — LOW (ref 3.3–5)
ALP SERPL-CCNC: 164 U/L — HIGH (ref 40–120)
ALT FLD-CCNC: 78 U/L RC — HIGH (ref 10–45)
ANION GAP SERPL CALC-SCNC: 24 MMOL/L — HIGH (ref 5–17)
APPEARANCE UR: ABNORMAL
APTT BLD: 63.8 SEC — HIGH (ref 27.5–37.4)
AST SERPL-CCNC: 85 U/L — HIGH (ref 10–40)
BACTERIA # UR AUTO: ABNORMAL /HPF
BASOPHILS # BLD AUTO: 0.1 K/UL — SIGNIFICANT CHANGE UP (ref 0–0.2)
BASOPHILS NFR BLD AUTO: 0.7 % — SIGNIFICANT CHANGE UP (ref 0–2)
BILIRUB SERPL-MCNC: 0.5 MG/DL — SIGNIFICANT CHANGE UP (ref 0.2–1.2)
BILIRUB UR-MCNC: NEGATIVE — SIGNIFICANT CHANGE UP
BUN SERPL-MCNC: 96 MG/DL — HIGH (ref 7–23)
CALCIUM SERPL-MCNC: 8.8 MG/DL — SIGNIFICANT CHANGE UP (ref 8.4–10.5)
CHLORIDE SERPL-SCNC: 93 MMOL/L — LOW (ref 96–108)
CK MB BLD-MCNC: 8 % — HIGH (ref 0–3.5)
CK MB CFR SERPL CALC: 7.1 NG/ML — HIGH (ref 0–3.8)
CK SERPL-CCNC: 89 U/L — SIGNIFICANT CHANGE UP (ref 25–170)
CO2 SERPL-SCNC: 15 MMOL/L — LOW (ref 22–31)
COLOR SPEC: YELLOW — SIGNIFICANT CHANGE UP
COMMENT - URINE: SIGNIFICANT CHANGE UP
CREAT SERPL-MCNC: 2.7 MG/DL — HIGH (ref 0.5–1.3)
DIFF PNL FLD: ABNORMAL
EOSINOPHIL # BLD AUTO: 0 K/UL — SIGNIFICANT CHANGE UP (ref 0–0.5)
EOSINOPHIL NFR BLD AUTO: 0.1 % — SIGNIFICANT CHANGE UP (ref 0–6)
EPI CELLS # UR: SIGNIFICANT CHANGE UP /HPF
GAS PNL BLDA: SIGNIFICANT CHANGE UP
GAS PNL BLDV: SIGNIFICANT CHANGE UP
GAS PNL BLDV: SIGNIFICANT CHANGE UP
GLUCOSE SERPL-MCNC: 148 MG/DL — HIGH (ref 70–99)
GLUCOSE UR QL: NEGATIVE — SIGNIFICANT CHANGE UP
HCT VFR BLD CALC: 30.6 % — LOW (ref 34.5–45)
HGB BLD-MCNC: 9.8 G/DL — LOW (ref 11.5–15.5)
INR BLD: >15 RATIO — CRITICAL HIGH (ref 0.88–1.16)
KETONES UR-MCNC: NEGATIVE — SIGNIFICANT CHANGE UP
LEUKOCYTE ESTERASE UR-ACNC: ABNORMAL
LYMPHOCYTES # BLD AUTO: 0.4 K/UL — LOW (ref 1–3.3)
LYMPHOCYTES # BLD AUTO: 2 % — LOW (ref 13–44)
MCHC RBC-ENTMCNC: 28.8 PG — SIGNIFICANT CHANGE UP (ref 27–34)
MCHC RBC-ENTMCNC: 32 GM/DL — SIGNIFICANT CHANGE UP (ref 32–36)
MCV RBC AUTO: 90.1 FL — SIGNIFICANT CHANGE UP (ref 80–100)
MONOCYTES # BLD AUTO: 1.4 K/UL — HIGH (ref 0–0.9)
MONOCYTES NFR BLD AUTO: 6.9 % — SIGNIFICANT CHANGE UP (ref 2–14)
NEUTROPHILS # BLD AUTO: 18.7 K/UL — HIGH (ref 1.8–7.4)
NEUTROPHILS NFR BLD AUTO: 90.3 % — HIGH (ref 43–77)
NITRITE UR-MCNC: NEGATIVE — SIGNIFICANT CHANGE UP
NT-PROBNP SERPL-SCNC: HIGH PG/ML (ref 0–300)
PH UR: 6 — SIGNIFICANT CHANGE UP (ref 5–8)
PLATELET # BLD AUTO: 391 K/UL — SIGNIFICANT CHANGE UP (ref 150–400)
POTASSIUM SERPL-MCNC: 5.5 MMOL/L — HIGH (ref 3.5–5.3)
POTASSIUM SERPL-SCNC: 5.5 MMOL/L — HIGH (ref 3.5–5.3)
PROT SERPL-MCNC: 7.1 G/DL — SIGNIFICANT CHANGE UP (ref 6–8.3)
PROT UR-MCNC: 150 MG/DL
PROTHROM AB SERPL-ACNC: 190.6 SEC — HIGH (ref 9.8–12.7)
RBC # BLD: 3.4 M/UL — LOW (ref 3.8–5.2)
RBC # FLD: 16.5 % — HIGH (ref 10.3–14.5)
RBC CASTS # UR COMP ASSIST: >50 /HPF (ref 0–2)
SODIUM SERPL-SCNC: 132 MMOL/L — LOW (ref 135–145)
SP GR SPEC: 1.02 — SIGNIFICANT CHANGE UP (ref 1.01–1.02)
TROPONIN T SERPL-MCNC: 0.04 NG/ML — SIGNIFICANT CHANGE UP (ref 0–0.06)
UROBILINOGEN FLD QL: NEGATIVE — SIGNIFICANT CHANGE UP
WBC # BLD: 20.7 K/UL — HIGH (ref 3.8–10.5)
WBC # FLD AUTO: 20.7 K/UL — HIGH (ref 3.8–10.5)
WBC UR QL: >50 /HPF (ref 0–5)

## 2018-04-13 PROCEDURE — 84295 ASSAY OF SERUM SODIUM: CPT

## 2018-04-13 PROCEDURE — 82550 ASSAY OF CK (CPK): CPT

## 2018-04-13 PROCEDURE — 93010 ELECTROCARDIOGRAM REPORT: CPT

## 2018-04-13 PROCEDURE — 82962 GLUCOSE BLOOD TEST: CPT

## 2018-04-13 PROCEDURE — 74176 CT ABD & PELVIS W/O CONTRAST: CPT | Mod: 26

## 2018-04-13 PROCEDURE — 85014 HEMATOCRIT: CPT

## 2018-04-13 PROCEDURE — 71045 X-RAY EXAM CHEST 1 VIEW: CPT

## 2018-04-13 PROCEDURE — 85027 COMPLETE CBC AUTOMATED: CPT

## 2018-04-13 PROCEDURE — 87040 BLOOD CULTURE FOR BACTERIA: CPT

## 2018-04-13 PROCEDURE — 81001 URINALYSIS AUTO W/SCOPE: CPT

## 2018-04-13 PROCEDURE — 83605 ASSAY OF LACTIC ACID: CPT

## 2018-04-13 PROCEDURE — 70450 CT HEAD/BRAIN W/O DYE: CPT

## 2018-04-13 PROCEDURE — 82947 ASSAY GLUCOSE BLOOD QUANT: CPT

## 2018-04-13 PROCEDURE — 87150 DNA/RNA AMPLIFIED PROBE: CPT

## 2018-04-13 PROCEDURE — 82553 CREATINE MB FRACTION: CPT

## 2018-04-13 PROCEDURE — 82330 ASSAY OF CALCIUM: CPT

## 2018-04-13 PROCEDURE — 70450 CT HEAD/BRAIN W/O DYE: CPT | Mod: 26

## 2018-04-13 PROCEDURE — 99291 CRITICAL CARE FIRST HOUR: CPT | Mod: GC

## 2018-04-13 PROCEDURE — 87086 URINE CULTURE/COLONY COUNT: CPT

## 2018-04-13 PROCEDURE — 84484 ASSAY OF TROPONIN QUANT: CPT

## 2018-04-13 PROCEDURE — 85730 THROMBOPLASTIN TIME PARTIAL: CPT

## 2018-04-13 PROCEDURE — 96375 TX/PRO/DX INJ NEW DRUG ADDON: CPT | Mod: XU

## 2018-04-13 PROCEDURE — 82435 ASSAY OF BLOOD CHLORIDE: CPT

## 2018-04-13 PROCEDURE — 51702 INSERT TEMP BLADDER CATH: CPT

## 2018-04-13 PROCEDURE — 99291 CRITICAL CARE FIRST HOUR: CPT | Mod: 25

## 2018-04-13 PROCEDURE — 99233 SBSQ HOSP IP/OBS HIGH 50: CPT | Mod: GC

## 2018-04-13 PROCEDURE — 83880 ASSAY OF NATRIURETIC PEPTIDE: CPT

## 2018-04-13 PROCEDURE — 82803 BLOOD GASES ANY COMBINATION: CPT

## 2018-04-13 PROCEDURE — 96374 THER/PROPH/DIAG INJ IV PUSH: CPT | Mod: XU

## 2018-04-13 PROCEDURE — 84132 ASSAY OF SERUM POTASSIUM: CPT

## 2018-04-13 PROCEDURE — 85610 PROTHROMBIN TIME: CPT

## 2018-04-13 PROCEDURE — 74176 CT ABD & PELVIS W/O CONTRAST: CPT

## 2018-04-13 PROCEDURE — 80053 COMPREHEN METABOLIC PANEL: CPT

## 2018-04-13 PROCEDURE — 87186 SC STD MICRODIL/AGAR DIL: CPT

## 2018-04-13 PROCEDURE — 94640 AIRWAY INHALATION TREATMENT: CPT

## 2018-04-13 PROCEDURE — 93005 ELECTROCARDIOGRAM TRACING: CPT | Mod: XU

## 2018-04-13 PROCEDURE — 71045 X-RAY EXAM CHEST 1 VIEW: CPT | Mod: 26

## 2018-04-13 RX ORDER — SODIUM CHLORIDE 9 MG/ML
1000 INJECTION INTRAMUSCULAR; INTRAVENOUS; SUBCUTANEOUS ONCE
Qty: 0 | Refills: 0 | Status: COMPLETED | OUTPATIENT
Start: 2018-04-13 | End: 2018-04-13

## 2018-04-13 RX ORDER — IPRATROPIUM/ALBUTEROL SULFATE 18-103MCG
3 AEROSOL WITH ADAPTER (GRAM) INHALATION ONCE
Qty: 0 | Refills: 0 | Status: COMPLETED | OUTPATIENT
Start: 2018-04-13 | End: 2018-04-13

## 2018-04-13 RX ORDER — EPINEPHRINE 0.3 MG/.3ML
0.05 INJECTION INTRAMUSCULAR; SUBCUTANEOUS
Qty: 4 | Refills: 0 | Status: DISCONTINUED | OUTPATIENT
Start: 2018-04-13 | End: 2018-04-13

## 2018-04-13 RX ORDER — PANTOPRAZOLE SODIUM 20 MG/1
40 TABLET, DELAYED RELEASE ORAL ONCE
Qty: 0 | Refills: 0 | Status: COMPLETED | OUTPATIENT
Start: 2018-04-13 | End: 2018-04-13

## 2018-04-13 RX ORDER — EPINEPHRINE 0.3 MG/.3ML
0.01 INJECTION INTRAMUSCULAR; SUBCUTANEOUS
Qty: 4 | Refills: 0 | Status: DISCONTINUED | OUTPATIENT
Start: 2018-04-13 | End: 2018-04-13

## 2018-04-13 RX ORDER — VANCOMYCIN HCL 1 G
750 VIAL (EA) INTRAVENOUS ONCE
Qty: 0 | Refills: 0 | Status: DISCONTINUED | OUTPATIENT
Start: 2018-04-13 | End: 2018-04-17

## 2018-04-13 RX ORDER — PIPERACILLIN AND TAZOBACTAM 4; .5 G/20ML; G/20ML
3.38 INJECTION, POWDER, LYOPHILIZED, FOR SOLUTION INTRAVENOUS ONCE
Qty: 0 | Refills: 0 | Status: COMPLETED | OUTPATIENT
Start: 2018-04-13 | End: 2018-04-13

## 2018-04-13 RX ORDER — PHYTONADIONE (VIT K1) 5 MG
5 TABLET ORAL ONCE
Qty: 0 | Refills: 0 | Status: COMPLETED | OUTPATIENT
Start: 2018-04-13 | End: 2018-04-13

## 2018-04-13 RX ORDER — MORPHINE SULFATE 50 MG/1
4 CAPSULE, EXTENDED RELEASE ORAL ONCE
Qty: 0 | Refills: 0 | Status: DISCONTINUED | OUTPATIENT
Start: 2018-04-13 | End: 2018-04-13

## 2018-04-13 RX ADMIN — Medication 3 MILLILITER(S): at 15:07

## 2018-04-13 RX ADMIN — Medication 101 MILLIGRAM(S): at 16:32

## 2018-04-13 RX ADMIN — SODIUM CHLORIDE 1000 MILLILITER(S): 9 INJECTION INTRAMUSCULAR; INTRAVENOUS; SUBCUTANEOUS at 17:15

## 2018-04-13 RX ADMIN — Medication 3 MILLILITER(S): at 15:06

## 2018-04-13 RX ADMIN — SODIUM CHLORIDE 2000 MILLILITER(S): 9 INJECTION INTRAMUSCULAR; INTRAVENOUS; SUBCUTANEOUS at 18:34

## 2018-04-13 RX ADMIN — SODIUM CHLORIDE 2000 MILLILITER(S): 9 INJECTION INTRAMUSCULAR; INTRAVENOUS; SUBCUTANEOUS at 15:06

## 2018-04-13 RX ADMIN — Medication 2 MILLIGRAM(S): at 19:54

## 2018-04-13 RX ADMIN — PIPERACILLIN AND TAZOBACTAM 200 GRAM(S): 4; .5 INJECTION, POWDER, LYOPHILIZED, FOR SOLUTION INTRAVENOUS at 16:33

## 2018-04-13 RX ADMIN — MORPHINE SULFATE 4 MILLIGRAM(S): 50 CAPSULE, EXTENDED RELEASE ORAL at 19:54

## 2018-04-13 RX ADMIN — EPINEPHRINE 1.67 MICROGRAM(S)/KG/MIN: 0.3 INJECTION INTRAMUSCULAR; SUBCUTANEOUS at 18:59

## 2018-04-13 RX ADMIN — PANTOPRAZOLE SODIUM 40 MILLIGRAM(S): 20 TABLET, DELAYED RELEASE ORAL at 15:06

## 2018-04-13 NOTE — DISCHARGE NOTE FOR THE EXPIRED PATIENT - HOSPITAL COURSE
88yo F with a history of Afib on Coumadin, HTN, non-Hodgkin lymphoma, COPD on 2L O2 and osteoarthritis presenting with confusion from [facility]. Pt at time of arrival AAOx3. Pt reportedly was confused and fell last night. Pt complaining of SOB and abdominal pain. Per nursing records, pt has had LLQ abdominal pain and 1 episode of coffee ground emesis this morning. Pt refusing to eat for the past 2 days. Labs from  showing BUN 59, creatinine 1.8, WBC 19.9. Pt empirically started on Zosyn at NH.    Pt here initially AAOx3 on exam, following commands, endorsed SOB and abdominal pain. Labs remarkable for leukocytosis, INR>15, creatinine 2.7, lactate of 6.1. Pt empirically given Zosyn and vancomycin. CT head was negative for any acute changes. CT abdomen/pelvis showed moderate to severe hydronephrosis on the left side. Pt's respiratory status worsened on nasal cannula and she was started on BiPAP. Pt's condition worsened with BP dropping to he 80s/60 despite 3L of NS given. Pt also developed bradycardia to the 20s and she was started on an epi drip. HR improved to the 40s on epi drip but BP remained in the 80s/60s. Pt's DNR/DNI status was re-confirmed with pt's daughter at bedside. MICU was consulted and after meeting with pt's family, it was decided to withdraw aggressive treatment and let the pt pass comfortably. Epi drip was stopped and pt was given Ativan and morphine for comfort. Pt noted to be in asystole at 20:20 and was pronounced at bedside. Emotional support given to family.  met with family for  home arrangements. 88yo F with a history of Afib on Coumadin, HTN, non-Hodgkin lymphoma, COPD on 2L O2 and osteoarthritis presenting with confusion from [facility]. Pt at time of arrival AAOx3. Pt reportedly was confused and fell last night. Pt complaining of SOB and abdominal pain. Per nursing records, pt has had LLQ abdominal pain and 1 episode of coffee ground emesis this morning. Pt refusing to eat for the past 2 days. Labs from  showing BUN 59, creatinine 1.8, WBC 19.9. Pt empirically started on Zosyn at NH.    Pt here initially AAOx3 on exam, following commands, endorsed SOB and abdominal pain. Labs remarkable for leukocytosis, INR>15, creatinine 2.7, lactate of 6.1. Pt empirically given Zosyn and vancomycin. CT head was negative for any acute changes. CT abdomen/pelvis showed moderate to severe hydronephrosis on the left side. Pt's respiratory status worsened on nasal cannula and she was started on BiPAP. Pt's condition worsened with BP dropping to he 80s/60 despite 3L of NS given. Pt also developed bradycardia to the 20s and she was started on an epi drip. HR improved to the 40s on epi drip but BP remained in the 80s/60s. Pt's DNR/DNI status was re-confirmed with pt's daughter at bedside. MICU was consulted and after meeting with pt's family, it was decided to withdraw aggressive treatment and let the pt pass comfortably. Epi drip was stopped and pt was given Ativan and morphine for comfort. Pt noted to be in asystole at 20:20 and was pronounced at bedside in the ER. Emotional support given to family.  met with family for  home arrangements. 88yo F with a history of Afib on Coumadin, HTN, non-Hodgkin lymphoma, COPD on 2L O2 and osteoarthritis presenting with confusion from [facility]. Pt at time of arrival AAOx3. Pt reportedly was confused and fell last night. Pt complaining of SOB and abdominal pain. Per nursing records, pt has had LLQ abdominal pain and 1 episode of coffee ground emesis this morning. Pt refusing to eat for the past 2 days. Labs from  showing BUN 59, creatinine 1.8, WBC 19.9. Pt empirically started on Zosyn at NH.    Pt here initially AAOx3 on exam, following commands, endorsed SOB and abdominal pain. Labs remarkable for leukocytosis, INR>15, creatinine 2.7, lactate of 6.1. Pt empirically given Zosyn and vancomycin. CT head was negative for any acute changes. CT abdomen/pelvis showed moderate to severe hydronephrosis on the left side. Pt's respiratory status worsened on nasal cannula and she was started on BiPAP. Pt's condition worsened with BP dropping to he 80s/60 despite 3L of NS given. Pt also developed bradycardia to the 20s and she was started on an epi drip. HR improved to the 40s on epi drip but BP remained in the 80s/60s. Pt's DNR/DNI status was re-confirmed with pt's daughter at bedside. MICU was consulted and after meeting with pt's family, it was decided to withdraw aggressive treatment and let the pt pass comfortably. Epi drip was stopped and pt was given Ativan and morphine for comfort. Pt noted to be in asystole at 20:20 and was pronounced at bedside in the ER. Emotional support given to family.  met with family for  home arrangements.    Medical examiner Dr. Blas Garcia was contacted regarding pt's expiration. Pt deemed not to be an ME case.

## 2018-04-13 NOTE — ED ADULT NURSE REASSESSMENT NOTE - NS ED NURSE REASSESS COMMENT FT1
Pt bradycardic and hypotensive. BP is low and fluids infusing as ordered. Second IV line blew and was removed. Dr. Hammer and Dr. Talbert aware and went to pt bedside to speak with family about pt condition. Pt daughter is aware that pt may not make it though the night and they want to keep DNR in place. Pt bradycardic and hypotensive. BP is low and fluids infusing as ordered. Second IV line blew and was removed. Bipap in place. Dr. Hammer and Dr. Talbert aware and went to pt bedside to speak with family about pt condition. Pt daughter is aware that pt may not make it though the night and they want to keep DNR in place. Pt bradycardic and hypotensive. BP is low and fluids infusing as ordered. Second IV line blew and was removed. Bipap in place. Dr. Hammer and Dr. Talbert aware and went to pt bedside to speak with family about pt condition. Pt daughter is aware that pt may not make it though the night and they want to keep DNR in place. MOLST form in chart. Pt bradycardic and hypotensive. BP is low and fluids infusing as ordered. Second IV line blew and was removed. Bipap in place. Dr. Hammer and Dr. Talbert aware and went to pt bedside to speak with family about pt condition. Pt daughter is aware that pt may not make it though the night and they want to keep DNR in place. MOLST form in chart. OK per Dr. Hammer to hold off on james and urine collection.

## 2018-04-13 NOTE — CONSULT NOTE ADULT - PROBLEM SELECTOR RECOMMENDATION 3
Spoke at length with daughter who stated pt was DNR/DNI.   At this point patient is on max epinephrine gtt with heart rate of 20's and MAP in 50's. As well as resp failure, kidney failure.   Family agrees that would not want mother to suffer, ED to confirm DNR/DNI.   Would give patient morphine 4 mg and ativan 2 mg to keep comfortable and turn epinephrine gtt when family decides.

## 2018-04-13 NOTE — ED PROVIDER NOTE - CRITICAL CARE PROVIDED
interpretation of diagnostic studies/consultation with other physicians/documentation/additional history taking/direct patient care (not related to procedure)/conducted a detailed discussion of DNR status

## 2018-04-13 NOTE — ED ADULT NURSE REASSESSMENT NOTE - NS ED NURSE REASSESS COMMENT FT1
MICU resident and attending Dr. Vann at bedside discussed situation with family. Family wants to withdraw care. Plan is to give morphine and ativan and discontinue epi drip.  called and states there are no rabbis available at this time.

## 2018-04-13 NOTE — ED ADULT NURSE REASSESSMENT NOTE - NS ED NURSE REASSESS COMMENT FT1
Diaper changed, james placed with Dr. Talbert at bedside. Pt positioned for comfort with bipap in place. Family at bedside and spoke with Dr. Talbert about pt condition. Pt on cardiac monitor anthony to 20s-30s. Fluids and epi drip infusing as ordered

## 2018-04-13 NOTE — ED PROVIDER NOTE - MEDICAL DECISION MAKING DETAILS
86yo F with h/o Afib, copd, HTN, nonhodgkin's lymphoma DNR/DNI per mol, presenting from [place] with confusion, possible fall; A&Ox0, mumbling, ill appearing, abd diffusely distended but nontender; plan: labs, including blood/urine culture, ua; CT AP (eval for the distension, r/o obstruction/abscess/ascites) and CT head (given possible fall, eval for ICH).

## 2018-04-13 NOTE — CONSULT NOTE ADULT - SUBJECTIVE AND OBJECTIVE BOX
CHIEF COMPLAINT: nausea/vomiting, confusion    HPI: 87F hx Atrial fibrillatoin, HTN, NH lymphoma, COPD on 2L, recent THR presents to hospital from nursing home after episode of nausea/vomiting and worsening confusion. History taken from daughter and ED provider as pt currently unable to talk.   As per nursing home report pt became confused and fell last night. Started complaining of abdominal pain and SOB. Pt had episode of hematemesis. She was empirically started on zosyn and sent to ED.   Upon arriving to ED pt was initially AAOx3, but quickly became obtunded and with worsening sob. CTH negative. Pt started on bipap. Pt then suddenly became bradycardic and hypotensive. Pt given 3L NS bolus with no response. Pt placed on epinephrine    After speaking with family, daughter at bedside states that she has had multiple discussions with mother who states would like to be DNR/DNI and would want comfort measures.     PAST MEDICAL & SURGICAL HISTORY:  COPD (chronic obstructive pulmonary disease)  A-fib  H/O: Osteoarthritis  Anxiety  History of Hypothyroidism  High Cholesterol  HTN (Hypertension)  S/P Carpal Tunnel Release  Hip Replacement  Knee Replacement      FAMILY HISTORY:      SOCIAL HISTORY:  Smoking: former smoker  Substance Use: none  EtOH Use: none  Marital Status: [ ] Single [ ]  [ ]  [x ]   Sexual History:   Occupation: retired  Recent Travel:  Country of Birth:  Advance Directives: DNR/DNI    Allergies    No Known Drug Allergies  shellfish (Diarrhea)    Intolerances        HOME MEDICATIONS:    REVIEW OF SYSTEMS:      [x ] Unable to assess ROS because ____obtunded____    OBJECTIVE:  ICU Vital Signs Last 24 Hrs  T(C): 37 (2018 14:55), Max: 37 (2018 14:55)  T(F): 98.6 (2018 14:55), Max: 98.6 (2018 14:55)  HR: 40 (2018 18:05) (40 - 107)  BP: 99/58 (2018 18:05) (85/67 - 123/79)  BP(mean): --  ABP: --  ABP(mean): --  RR: 40 (2018 18:05) (18 - 40)  SpO2: 97% (2018 18:05) (97% - 100%)        CAPILLARY BLOOD GLUCOSE      POCT Blood Glucose.: 142 mg/dL (2018 14:49)      GENERAL: Currently unresponsive to tactile and verbal stimuli, on bipap  HEENT:  Atraumatic, Normocephalic  EYES: EOMI, PERRLA, conjunctiva and sclera clear  NECK: Supple, No JVD  CHEST/LUNG: Clear to auscultation bilaterally; No wheezes, rales, or rhonchi  HEART: marked bradycardia  ABDOMEN: Soft, Nontender, Nondistended; Bowel sounds present  EXTREMITIES:  mottled feet and legs, no palpable pulses  NEUROLOGY: pt not responsive  SKIN: No rashes or lesions    LINES: [facility] MEDICATIONS:    vancomycin  IVPB 750 milliGRAM(s) IV Intermittent once    LABS:                        9.8    20.7  )-----------( 391      ( 2018 15:05 )             30.6     Hgb Trend: 9.8<--      132<L>  |  93<L>  |  96<H>  ----------------------------<  148<H>  5.5<H>   |  15<L>  |  2.70<H>    Ca    8.8      2018 15:05    TPro  7.1  /  Alb  3.0<L>  /  TBili  0.5  /  DBili  x   /  AST  85<H>  /  ALT  78<H>  /  AlkPhos  164<H>      Creatinine Trend: 2.70<--  PT/INR - ( 2018 15:04 )   PT: 190.6 sec;   INR: >15.00 ratio         PTT - ( 2018 15:04 )  PTT:63.8 sec  Urinalysis Basic - ( 2018 19:11 )    Color: Yellow / Appearance: Turbid / S.021 / pH: x  Gluc: x / Ketone: Negative  / Bili: Negative / Urobili: Negative   Blood: x / Protein: 150 mg/dL / Nitrite: Negative   Leuk Esterase: Moderate / RBC: >50 /HPF / WBC >50 /HPF   Sq Epi: x / Non Sq Epi: OCC /HPF / Bacteria: Few /HPF      Arterial Blood Gas:   @ 15:25  7.32/22/201/11/99/-13.6  ABG lactate: --    Venous Blood Gas:   @ 15:04  7.29/35/23/  VBG Lactate: 4.3      MICROBIOLOGY:     RADIOLOGY:  [x ] Reviewed and interpreted by me  < from: CT Head No Cont (18 @ 16:24) >  IMPRESSION:  No intracranial hemorrhage, mass effect, hydrocephalus or midline shift.    Age-related parenchymal volume loss and ischemic changes of the white   matter.    < end of copied text >    < from: CT Abdomen and Pelvis No Cont (18 @ 16:24) >  IMPRESSION:     Moderate to severe left hydronephrosis in a UPJ pattern.    < end of copied text >    EKG:

## 2018-04-13 NOTE — ED ADULT NURSE REASSESSMENT NOTE - NS ED NURSE REASSESS COMMENT FT1
Pt has continued respiratory distress, pursed lip breathing and abdominal breathing. Pt is becoming progressively more tired. Dr. Talbert aware. Pt was transported to CT scan on 2L nasal cannula which is OK with Dr. Talbert. Pt will go on bipap when she returns to room 21. Respiratory aware. Pt is DNR according to paperwork from nursing home and DNR in sunrise Pt has continued respiratory distress, pursed lip breathing and abdominal breathing. Pt is becoming progressively more tired. Dr. Talbert aware. Pt was transported to CT scan on 2L nasal cannula which is OK with Dr. Talbert. Pt will go on bipap when she returns to room 21. Respiratory aware. Pharmacy contacted for vitamin K. Pt is DNR according to paperwork from nursing home and DNR in sunrise.

## 2018-04-13 NOTE — ED ADULT NURSE NOTE - NS ED NURSE LEVEL OF CONSCIOUSNESS MENTAL STATUS
VACCINE ADMINISTRATION RECORD  PARENT / GUARDIAN APPROVAL  Date: 2023  Vaccine administered to: Simone Tatum     : 2021    MRN: [id number]    A copy of the appropriate Centers for Disease Control and Prevention Vaccine Information statement has been provided. I have read or have had explained the information about the diseases and the vaccines listed below. There was an opportunity to ask questions and any questions were answered satisfactorily. I believe that I understand the benefits and risks of the vaccine cited and ask that the vaccine(s) listed below be given to me or to the person named above (for whom I am authorized to make this request). VACCINES ADMINISTERED:  MMR    I have read and hereby agree to be bound by the terms of this agreement as stated above. My signature is valid until revoked by me in writing. This document is signed by parent, relationship: parent on 2023.:                                                                                                                                         Parent / Gretta Reef                                                Date    Lluvia Motta RN served as a witness to authentication that the identity of the person signing electronically is in fact the person represented as signing. This document was generated by Lluvia Motta RN on 2023. Alert/Awake

## 2018-04-13 NOTE — ED ADULT NURSE NOTE - OBJECTIVE STATEMENT
Pt presents to ED awake and alert, brought in by EMS d/t increase WBC count at the nursing home. Pt lives in [facility]. EMS reports pt is AMS for the past two days. PT is normally A&OX3. Pt normally uses 2L nasal cannula d/t COPD. Pt as reportedly c/o abdominal pain the last two days. Pt reportedly fell at the nursing home today. Pt denies pain. Pt is A&Ox3 upon arrival. Labored breathing noted- pt cannot complete a full sentence without taking a breath. Tachypneic to 30s. Skin is reddened and dark in color throughout entire body. Crackles to bases of BL lungs. Oral mucosa is extremely dry.

## 2018-04-13 NOTE — ED PROVIDER NOTE - ATTENDING CONTRIBUTION TO CARE
86yo F with h/o Afib, copd, HTN, nonhodgkin's lymphoma DNR/DNI per mol, presenting from [place] with confusion, possible fall; A&Ox0, mumbling, ill appearing, abd diffusely distended but nontender; plan: labs, including blood/urine culture, ua; CT AP (eval for the distension, r/o obstruction/abscess/ascites) and CT head (given possible fall, eval for ICH).

## 2018-04-13 NOTE — ED PROVIDER NOTE - PROGRESS NOTE DETAILS
Pt bradycardic to 20s-30s, started on Epi drip Patient having increasing work of breathing and difficult to arouse.  CTs performed, no obvious bleeding in brain CT.  Starting on Bipap.  Abi family at bedside; explained terminal situation to family, daughter present (HCP), and agrees with DNR and DNI (was patient's wishes).  Patient at present maintaining hr 40's, bp with map low 60s, titrating epi drip up. BPs decreased; per d/w family with myself, Dr Martinez (micu attending): family does not want aggressive care, no central line.  decision made to withdrawal care as HR not improving (actually decreasing to 20s) at this point despite max epi gtt.  Will given morphine/ativan and stop epi infusion.  Daughter agrees with plan, offered opportunities to ask and have answered all questions.  MICU consultation and assistance is appreciated.  -Nitish attending Darlene: Pt's monitor with asystole. No pulse, absence of respirations. Family at bedside. Time of death called at 20:20. Will have social work speak with family regarding  arrangements. Patient having increasing work of breathing and difficult to arouse.  CTs performed, no obvious bleeding in brain CT.    Starting on Bipap.  Abi family at bedside; explained terminal situation to family, daughter present (HCP), and agrees with DNR and DNI (was patient's wishes).  Patient at present maintaining hr 40's, bp with map low 60s, titrating epi drip up. -Nitish  Addendum: CT showing left hydronephrosis, severe; concern for obstruction (no visible stone) and possible infection; james catheter inserted and ua sent to lab; concern for shock and sepsis. -Nitish

## 2018-04-13 NOTE — DISCHARGE NOTE FOR THE EXPIRED PATIENT - OTHER SIGNIFICANT FINDINGS
< from: CT Head No Cont (04.13.18 @ 16:24) >  IMPRESSION:  No intracranial hemorrhage, mass effect, hydrocephalus or midline shift.    Age-related parenchymal volume loss and ischemic changes of the white   matter.    < from: CT Abdomen and Pelvis No Cont (04.13.18 @ 16:24) >  IMPRESSION:     Moderate to severe left hydronephrosis in a UPJ pattern.

## 2018-04-13 NOTE — CONSULT NOTE ADULT - PROBLEM SELECTOR RECOMMENDATION 9
Pt presented with kidney and respiratory failure quickly turning to multi organ failure. Faith placed with no UOP.   Unclear etiology at this point as did not have time for work up.  Pt currently on max epinephrine gtt with heart rate in 20's and MAP in 50's.   Pt will not likely last long at this point.  Spoke with family at length at bedside who states pt is DNR/DNI and would not want aggressive measure.

## 2018-04-13 NOTE — ED PROVIDER NOTE - QRS
----- Message from Frida Bennett MD sent at 2/22/2018  9:11 AM EST -----  Mail letter that pap smear is normal and let patient know that her vaginal swab was positive for yeast and BV and prescriptions were sent.   100

## 2018-04-13 NOTE — CONSULT NOTE ADULT - ASSESSMENT
87F hx Atrial fibrillatoin, HTN, NH lymphoma, COPD on 2L, recent THR presents to hospital from nursing home after episode of nausea/vomiting and worsening confusion.

## 2018-04-13 NOTE — ED PROVIDER NOTE - OBJECTIVE STATEMENT
86yo F with a history of Afib on Coumadin, HTN, non-Hodgkin lymphoma, COPD on 2L O2 and osteoarthritis presenting with confusion from [facility]. Pt at time of arrival AAOx3. Pt reportedly was confused and fell last night. Pt complaining of SOB and abdominal pain. Per nursing records, pt has had LLQ abdominal pain and 1 episode of coffee ground emesis this morning. Pt refusing to eat for the past 2 days. Labs from 4/11 showing BUN 59, creatinine 1.8, WBC 19.9. Pt empirically started on Zosyn at NH.

## 2018-04-13 NOTE — ED ADULT NURSE REASSESSMENT NOTE - NS ED NURSE REASSESS COMMENT FT1
Pt went into asystole without a pulse at 2020. Dr. Colon at bedside to pronounce time of death at 2020.  Maren notified and coming to bedside to help family make arrangements.

## 2018-04-13 NOTE — CONSULT NOTE ADULT - ATTENDING COMMENTS
87F Hx HTN, COPD on 2L, NH lymphoma, recent THR sent from nursing home for acute worsening confusion associated with nausea and vomiting. Her condition quickly deteriorated into anthony arrest, septic vs. cardiogenic shock, multiorgan failure, agio gap metabolic acidosis, ARF-ATN, Coagulopathy wit probably in DIC requiring Pressor support but intubation was deferred as per health care Proxy - DNR/DNI confirmed by her daughter at bedside. Family finally decided for full comfort measure and want withdrawal of life support.   Discussed the decision with ED attending.

## 2018-04-14 LAB
E COLI DNA BLD POS QL NAA+NON-PROBE: SIGNIFICANT CHANGE UP
GRAM STN FLD: SIGNIFICANT CHANGE UP
METHOD TYPE: SIGNIFICANT CHANGE UP
SPECIMEN SOURCE: SIGNIFICANT CHANGE UP
SPECIMEN SOURCE: SIGNIFICANT CHANGE UP

## 2018-04-15 LAB
CULTURE RESULTS: NO GROWTH — SIGNIFICANT CHANGE UP
SPECIMEN SOURCE: SIGNIFICANT CHANGE UP

## 2018-04-16 LAB
-  AMIKACIN: SIGNIFICANT CHANGE UP
-  AMPICILLIN/SULBACTAM: SIGNIFICANT CHANGE UP
-  AMPICILLIN: SIGNIFICANT CHANGE UP
-  AZTREONAM: SIGNIFICANT CHANGE UP
-  CEFAZOLIN: SIGNIFICANT CHANGE UP
-  CEFEPIME: SIGNIFICANT CHANGE UP
-  CEFOXITIN: SIGNIFICANT CHANGE UP
-  CEFTRIAXONE: SIGNIFICANT CHANGE UP
-  CIPROFLOXACIN: SIGNIFICANT CHANGE UP
-  ERTAPENEM: SIGNIFICANT CHANGE UP
-  GENTAMICIN: SIGNIFICANT CHANGE UP
-  IMIPENEM: SIGNIFICANT CHANGE UP
-  LEVOFLOXACIN: SIGNIFICANT CHANGE UP
-  MEROPENEM: SIGNIFICANT CHANGE UP
-  PIPERACILLIN/TAZOBACTAM: SIGNIFICANT CHANGE UP
-  TOBRAMYCIN: SIGNIFICANT CHANGE UP
-  TRIMETHOPRIM/SULFAMETHOXAZOLE: SIGNIFICANT CHANGE UP
CULTURE RESULTS: SIGNIFICANT CHANGE UP
CULTURE RESULTS: SIGNIFICANT CHANGE UP
METHOD TYPE: SIGNIFICANT CHANGE UP
ORGANISM # SPEC MICROSCOPIC CNT: SIGNIFICANT CHANGE UP
SPECIMEN SOURCE: SIGNIFICANT CHANGE UP
SPECIMEN SOURCE: SIGNIFICANT CHANGE UP

## 2019-04-05 NOTE — PROGRESS NOTE ADULT - PROBLEM SELECTOR PLAN 2
Please let patient know blood work will be given at appointment.   
[Follow-Up: _____] : a [unfilled] follow-up visit
incentive spirometry   Deep breathing exercises  Bronchodilator therapy

## 2019-12-09 NOTE — PATIENT PROFILE ADULT. - NSTOBACCONEVERSMOKERY/N_GEN_A
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

## 2022-02-16 NOTE — ED ADULT NURSE REASSESSMENT NOTE - NS ED NURSE REASSESS COMMENT FT1
Will reassess sx plan after OS is stable. As per Dr. Geeta AVALOS to leave epi drip infusing through peripheral IV

## 2022-06-24 NOTE — PRE-OP CHECKLIST - PATIENT'S PERSONAL PROPERTY REMOVED
----- Message from Gomez Leal sent at 6/24/2022  1:19 PM EDT -----  Subject: Appointment Request    Reason for Call: Routine ED Follow Up Visit    QUESTIONS  Type of Appointment? Established Patient  Reason for appointment request? Available appointments did not meet   patient need  Additional Information for Provider? patient was seen in ER for open sore   on buttocks stated its down to muscle.  ---------------------------------------------------------------------------  --------------  CALL BACK INFO  What is the best way for the office to contact you? OK to leave message on   voicemail  Preferred Call Back Phone Number? [phone]  ---------------------------------------------------------------------------  --------------  SCRIPT ANSWERS  Relationship to Patient? Self  (Patient requests to see provider urgently. )? No  Do you have any questions for your primary care provider that need to be   answered prior to your appointment? No  Have you been diagnosed with COVID-19 in the past 10 days? No  (Service Expert  click yes below to proceed with [facility] As [facility]ual   Scheduling)?  Yes
Called and spoke to patient regarding previous message and she advised she was seen at Urgent Care and was advised there was no infection in the area but she needs to follow up with PCP to get orders for Wound care. Patient notes that she was prescribed antibiotic, no name as she has not picked up. Patient states she was told that the area on the buttock is down to the muscle. Patient requesting a Wednesday appt.       Patient notes she is keeping the area cleaned really well and allowing it to air as directed    Patient placed on NP schedule for Wednesday     Call complete
dentures

## 2022-12-09 NOTE — ED ADULT NURSE REASSESSMENT NOTE - TEMPLATE LIST FOR HEAD TO TOE ASSESSMENT
Respiratory Lara Alves MD  GENERAL: Patient awake alert NAD.  HEENT: NC/AT, Moist mucous membranes, PERRL, EOMI.  LUNGS: CTAB, no wheezes or crackles.   CARDIAC: RRR, no m/r/g.    ABDOMEN: Soft, NT, ND, No rebound, guarding.   EXT: No edema. No calf tenderness.   MSK: No spinal tenderness, no pain with movement, no deformities.  NEURO: A&Ox1-2. Moving all extremities.  SKIN: Warm and dry. No rash.  PSYCH: Normal affect. Lara Alves MD  GENERAL: Patient awake alert NAD.  HEENT: NC/AT, Moist mucous membranes, PERRL, EOMI.  LUNGS: CTAB, no wheezes or crackles.   CARDIAC: RRR, no m/r/g.    ABDOMEN: Soft, NT, ND, No rebound, guarding.   EXT: No edema. No calf tenderness.   MSK: No spinal tenderness, no pain with movement, no deformities.  NEURO: A&Ox1-2. Moving all extremities. cn 2-12 grossly intact  SKIN: Warm and dry. No rash.  PSYCH: Normal affect. Lara Alves MD  GENERAL: Patient awake alert NAD.  HEENT: NC/AT, Moist mucous membranes, PERRL, EOMI.  LUNGS: CTAB, no wheezes or crackles.   CARDIAC: RRR, no m/r/g.    ABDOMEN: Soft, NT, ND, No rebound, guarding.   EXT: No edema. No calf tenderness.   MSK: No spinal tenderness, no pain with movement, no deformities.  NEURO: A&Ox1-2. Moving all extremities. cn 2-12 grossly intact. L UE and LE weaker than R  SKIN: Warm and dry. No rash.  PSYCH: Normal affect.

## 2023-06-20 NOTE — ED ADULT NURSE REASSESSMENT NOTE - TEMPLATE LIST FOR HEAD TO TOE ASSESSMENT
PSR's - please schedule patient with [facility]'s or Dr. Vogel for follow-up on headaches.  Thanks!   Respiratory

## 2023-11-16 NOTE — H&P ADULT - NSHPPOADEEPVENOUSTHROMB_GEN_A_CORE
Please inform that mammo is normal but due to breast density a screening US is also being recommended.  If she would like to get that done, please let me know an I will place the order. 
no